# Patient Record
Sex: FEMALE | Race: BLACK OR AFRICAN AMERICAN | NOT HISPANIC OR LATINO | Employment: OTHER | URBAN - METROPOLITAN AREA
[De-identification: names, ages, dates, MRNs, and addresses within clinical notes are randomized per-mention and may not be internally consistent; named-entity substitution may affect disease eponyms.]

---

## 2017-03-10 ENCOUNTER — OFFICE VISIT (OUTPATIENT)
Dept: OTOLARYNGOLOGY | Facility: CLINIC | Age: 72
End: 2017-03-10
Payer: COMMERCIAL

## 2017-03-10 ENCOUNTER — HOSPITAL ENCOUNTER (OUTPATIENT)
Dept: RADIOLOGY | Facility: HOSPITAL | Age: 72
Discharge: HOME | End: 2017-03-10
Attending: OTOLARYNGOLOGY
Payer: COMMERCIAL

## 2017-03-10 ENCOUNTER — PATIENT MESSAGE (OUTPATIENT)
Dept: OTOLARYNGOLOGY | Facility: CLINIC | Age: 72
End: 2017-03-10

## 2017-03-10 VITALS
TEMPERATURE: 98 F | WEIGHT: 132.25 LBS | DIASTOLIC BLOOD PRESSURE: 69 MMHG | SYSTOLIC BLOOD PRESSURE: 122 MMHG | HEIGHT: 66 IN | HEART RATE: 80 BPM | BODY MASS INDEX: 21.25 KG/M2

## 2017-03-10 DIAGNOSIS — J32.9 CHRONIC SINUSITIS, UNSPECIFIED LOCATION: ICD-10-CM

## 2017-03-10 DIAGNOSIS — J30.89 PERENNIAL ALLERGIC RHINITIS, UNSPECIFIED ALLERGIC RHINITIS TRIGGER: Primary | ICD-10-CM

## 2017-03-10 DIAGNOSIS — H61.23 BILATERAL IMPACTED CERUMEN: ICD-10-CM

## 2017-03-10 DIAGNOSIS — J30.89 PERENNIAL ALLERGIC RHINITIS, UNSPECIFIED ALLERGIC RHINITIS TRIGGER: ICD-10-CM

## 2017-03-10 PROCEDURE — 69210 REMOVE IMPACTED EAR WAX UNI: CPT | Mod: S$GLB,,, | Performed by: OTOLARYNGOLOGY

## 2017-03-10 PROCEDURE — 99204 OFFICE O/P NEW MOD 45 MIN: CPT | Mod: 25,S$GLB,, | Performed by: OTOLARYNGOLOGY

## 2017-03-10 PROCEDURE — 3074F SYST BP LT 130 MM HG: CPT | Mod: S$GLB,,, | Performed by: OTOLARYNGOLOGY

## 2017-03-10 PROCEDURE — 99999 PR PBB SHADOW E&M-EST. PATIENT-LVL III: CPT | Mod: PBBFAC,,, | Performed by: OTOLARYNGOLOGY

## 2017-03-10 PROCEDURE — 3078F DIAST BP <80 MM HG: CPT | Mod: S$GLB,,, | Performed by: OTOLARYNGOLOGY

## 2017-03-10 PROCEDURE — 70220 X-RAY EXAM OF SINUSES: CPT | Mod: TC,PO

## 2017-03-10 PROCEDURE — 70220 X-RAY EXAM OF SINUSES: CPT | Mod: 26,,, | Performed by: RADIOLOGY

## 2017-03-10 PROCEDURE — 1157F ADVNC CARE PLAN IN RCRD: CPT | Mod: S$GLB,,, | Performed by: OTOLARYNGOLOGY

## 2017-03-10 PROCEDURE — 1159F MED LIST DOCD IN RCRD: CPT | Mod: S$GLB,,, | Performed by: OTOLARYNGOLOGY

## 2017-03-10 PROCEDURE — 1160F RVW MEDS BY RX/DR IN RCRD: CPT | Mod: S$GLB,,, | Performed by: OTOLARYNGOLOGY

## 2017-03-10 RX ORDER — LINACLOTIDE 290 UG/1
CAPSULE, GELATIN COATED ORAL
Refills: 0 | COMMUNITY
Start: 2016-12-23 | End: 2018-10-15

## 2017-03-10 RX ORDER — TRAZODONE HYDROCHLORIDE 50 MG/1
50 TABLET ORAL NIGHTLY
Refills: 0 | COMMUNITY
Start: 2016-12-07 | End: 2017-06-06

## 2017-03-10 RX ORDER — MONTELUKAST SODIUM 10 MG/1
10 TABLET ORAL DAILY
Refills: 0 | COMMUNITY
Start: 2017-03-02 | End: 2018-05-21

## 2017-03-10 NOTE — PATIENT INSTRUCTIONS
Impacted Earwax  Impacted earwax is a buildup of the natural wax in the ear (cerumen). Impacted earwax is very common. It can cause symptoms such as hearing loss. It can also stop a doctor doing an exam of your ear.    Understanding earwax  Tiny glands in your ear make substances that combine with dead skin cells to form earwax. Earwax helps protect your ear canal from water, dirt, infection, and injury. Over time, earwax travels from the inner part of your ear canal to the entrance of the canal. Then it falls away naturally. But in some cases, it cant travel to the entrance of the canal. This may be because of a health condition or objects put in the ear. With age, earwax tends to become harder and less fluid. Older adults are more likely to have problems with earwax buildup.    Symptoms of impacted earwax  Excess earwax usually does not cause any symptoms, unless there is a large amount of buildup. Then it may cause symptoms such as:  · Hearing loss  · Earache  · Sense of ear fullness  · Itching in the ear  · Dizziness  · Ringing in the ears  · Cough    Treatment for impacted earwax  If you dont have symptoms, you may not need treatment. Often the earwax goes away on its own with time. If you have symptoms, you may have 1 or more treatments such as:  · Ear drops.  Over-the-counter ear rinses such as Debrox can be used to help soften and rinse out wax buildup within the ear.  Also the use of mineral oil in the ear can help soften the wax and assist in allowing the wax to clear.  · Removal of the earwax with small tools. This is also done in a doctors office.  ·     Dont use these at home  Health care providers do not advise use of ear candles or ear vacuum kits. These methods are not shown to work.   Preventing impacted earwax  You may not be able to prevent impacted earwax if you have a health condition that causes it, such as eczema. In other cases, you may be able to prevent earwax buildup by:  · Using ear  drops such as Debrox once or twice a week  · Not using cotton swabs in the ear        If earwax is persistent after conservative measures such as using mineral oil and over-the-counter ear rinses, he will need contact your physician for an appointment to have it removed in the office.

## 2017-03-10 NOTE — PROGRESS NOTES
Subjective:   Patient: Saskia Torres [id number], :1945   Visit date:3/10/2017 3:26 PM    Chief Complaint:  Sinus Problem    HPI:  Saskia is a 71 y.o. female is here for evaluation of the following issue(s):    Sinonasal / Allergy: Saskia has no significant nasal obstruction. She reports the the following sinonasal symptoms: facial pain, facial pressure, headaches, post-nasal drip and rhinorrhea (runny nose) and occasional nosebleeds.   She denies the the following sinonasal symptoms: aspirin intolerance, asthma, nasal obstruction, significant history of dental procedure just prior to the onset of sinus problems, significant history of prior facial trauma or fractures and history of nasal surgery. She has been  on medications for these symptoms. Medications: Flonase, ORAL ANTIBIOTIC(S), Allegra and Zyrtec which has been ineffective.    She has had constant sinus infections in the past 12 months.  There have been no recent imaging study of the sinuses (none).  She had CT maxillofacial 7 years ago after a fall.    She has moderate allergic rhinitis with symptoms including coughing, eye itchiness, eye watering, nasal itchiness, nasal rhinorrhea, sneezing and itchy throat.  She denies the following allergy symptoms:   wheezing    Allergy testing for this patient was not done.    Current smoker:  No      Review of Systems:  Gen:  []fever   []fatigue  HENT:  []nosebleeds  []dental problem   Eyes:  []photophobia  []visual disturbance  Resp:  []chest tightness []wheezing  Card:  []chest pain  []leg swelling  GI:  []abdominal pain []blood in stool  :  []dysuria  []hematuria  Musc:  []joint swelling  []gait problem  Skin:  []color change  []pallor  Neuro:  []seizures  []numbness  Hem:  []bruise/bleed easily  Psych:  []hallucinations  []behavioral problems  Allergy/Imm: is allergic to no known drug allergies.    Her meds, allergies, medical, surgical, social & family histories were reviewed & updated:  -     She has  "a current medication list which includes the following prescription(s): dymista, estradiol, ezetimibe-simvastatin 10-20 mg, famciclovir, furosemide, linzess, lisinopril, lisinopril-hydrochlorothiazide, lisinopril-hydrochlorothiazide, montelukast, olopatadine, potassium chloride sa, prednisolone acetate, synthroid, topiramate, trazodone, veramyst, and zetia.  -     She  has a past medical history of Cataract; Hyperlipidemia; Hypertension; Hypothyroid; Insulin overdose (2014); and Interstitial cystitis.   -     She  does not have any pertinent problems on file.   -     She  has a past surgical history that includes  section; Hysterectomy; Oophorectomy; Cataract extraction w/  intraocular lens implant (Right, OD 14); and Cataract extraction w/  intraocular lens implant (Left, 3-18-15).  -     She  reports that she has quit smoking. She has never used smokeless tobacco. She reports that she drinks alcohol. She reports that she does not use illicit drugs.  -     Her family history includes Hyperlipidemia in her father; Hypertension in her father.  -     She is allergic to no known drug allergies.    Objective:     Physical Exam:  Vitals:  /69  Pulse 80  Temp 97.9 °F (36.6 °C)  Ht 5' 6" (1.676 m)  Wt 60 kg (132 lb 4.4 oz)  BMI 21.35 kg/m2  Appearance:  Well-developed, well-nourished.  Communication:  Able to communicate, no hoarseness.  Head & Face:  Normocephalic, atraumatic, no sinus tenderness, normal facial strength.  Eyes:  Extraocular motions intact.  Ears:  After cerumen removal, Otoscopy of external auditory canals and tympanic membranes was normal, clinical speech reception thresholds grossly intact, no mass/lesion of auricle.  Nose:  No masses/lesions of external nose, nasal mucosa, septum, and turbinates were within normal limits.  Mouth:  No mass/lesion of lips, teeth, gums, hard/soft palate, tongue, tonsils, or oropharynx.  Neck & Lymphatics:  No cervical lymphadenopathy, no neck " mass/crepitus/ asymmetry, trachea is midline, no thyroid enlargement/tenderness/mass.  Neuro/Psych: Alert with normal mood and affect.   Abdominal: Normal appearance.   Respiration/Chest:  Symmetric expansion during respiration, normal respiratory effort.  Skin:  Warm and intact  Cardiovascular:  No peripheral vascular edema or varicosities.  Unable to visualize larynx on mirror exam.    I personally reviewed the CT maxillofacial from 2010.  There was no significant mucosal thickening or air-fluid levels except for a very small amount of secretions in the left sphenoid.    Assessment & Plan:   Saskia was seen today for sinus problem.    Diagnoses and all orders for this visit:    Perennial allergic rhinitis, unspecified allergic rhinitis trigger  -     CBC auto differential; Future  -     Aspergillus fumagatus IgE; Future  -     Bermuda grass IgE; Future  -     Cat epithelium IgE; Future  -     Cladosporium IgE; Future  -     Cockroach, American IgE; Future  -     [place], bald IgE; Future  -     D. farinae IgE; Future  -     D. pteronyssinus IgE; Future  -     Dog dander IgE; Future  -     Plantain, English IgE; Future  -     Dru grass IgE; Future  -     Marsh elder, rough IgE; Future  -     Mugwort IgE; Future  -     Nettle IgE; Future  -     Oak, white IgE; Future  -     Penicillium IgE; Future  -     Ragweed, short, common IgE; Future  -     Juan Manuel IgE; Future  -     Allergen, Cocklebur; Future  -     Allergen, Elm Cedar; Future  -     Allergen, Meadow Grass ([facility]); Future  -     Allergen, Mucor Racemosus; Future  -     Allergen, Pecan [place] IgE; Future  -     Allergen, White Keo; Future  -     Allergen-Alternaria Alternata; Future  -     Allergen-Cedar; Future  -     Allergen-Common Pigweed; Future  -     Allergen-Silver Birch; Future  -     Feather Panel #2; Future  -     RAST Allergen for [place]; Future  -     RAST Allergen Maple ([place]); Future  -     RAST Allergen [place];  Future  -     RAST Allergen, [place]arters; Future  -     RAST Allergen, Sheep [place]); Future  -     X-Ray Sinuses Min 3 Views; Future    Bilateral impacted cerumen    Chronic sinusitis, unspecified location  -     CBC auto differential; Future  -     Aspergillus fumagatus IgE; Future  -     Bermuda grass IgE; Future  -     Cat epithelium IgE; Future  -     Cladosporium IgE; Future  -     Cockroach, American IgE; Future  -     [place], bald IgE; Future  -     D. farinae IgE; Future  -     D. pteronyssinus IgE; Future  -     Dog dander IgE; Future  -     Plantain, English IgE; Future  -     Dru grass IgE; Future  -     Marsh elder, rough IgE; Future  -     Mugwort IgE; Future  -     Nettle IgE; Future  -     Oak, white IgE; Future  -     Penicillium IgE; Future  -     Ragweed, short, common IgE; Future  -     Juan Manuel IgE; Future  -     Allergen, Cocklebur; Future  -     Allergen, Elm Cedar; Future  -     Allergen, Meadow Grass ([facility]); Future  -     Allergen, Mucor Racemosus; Future  -     Allergen, Pecan [place] IgE; Future  -     Allergen, White Keo; Future  -     Allergen-Alternaria Alternata; Future  -     Allergen-Cedar; Future  -     Allergen-Common Pigweed; Future  -     Allergen-Silver Birch; Future  -     Feather Panel #2; Future  -     RAST Allergen for [place]; Future  -     RAST Allergen Maple ([place]); Future  -     RAST Allergen [place]; Future  -     RAST Allergen, Lamb's Quarters; Future  -     RAST Allergen, Sheep Lavalette([place]); Future  -     X-Ray Sinuses Min 3 Views; Future    -SINUSITIS/RHINITIS  Saskia presents today for initial evaluation.  They have multiple sinonasal complaints and determination of the underlying etiology is a problem of moderate to high complexity.  Patients may present with sinonasal symptoms such as nasal obstruction as a primary anatomic disorder.  Patients may also present with recurrent or chronic inflammatory sinonasal  symptoms.  Generally, patients can be stratified into one of several groups.      The first group represents patients who have frequent or recurrent upper respiratory infections, most frequently viral.  These patients most frequently have normally functioning immune system's but have high levels of exposure.  This is commonly seen in nurses and schoolteachers as well as other groups who spend large amounts of time around 6 patient's and children.      Other patients may have isolated rhinitis without evidence of sinusitis.  The majority of these patients have ALLERGIC rhinitis however other groups may have more rare forms of rhinitis such as nonallergic rhinitis with eosinophilia syndrome.  As a screening evaluation, if the patient has had a normal endoscopy, from time to time a simple x-ray of the sinuses may be performed in combination with antigen specific ALLERGY testing.    The third group of patients present with significant evidence of chronic sinusitis.  Nasal endoscopy may reveal polyps or purulent drainage.  CT scan is an important aspect to determining the extent of disease.  Some patients with chronic sinusitis have an underlying etiology of ALLERGY leading to obstruction of the ostiomeatal units with furthering of the infection.  Others may have an acute infection that leads to stenosis of the sinonasal openings followed by a long, progressive course of infection.  Patients with unilateral disease who do not have inverting papilloma typically fall into this latter group.    CT scan of the sinuses has not been performed.      Antigen specific allergy testing  has not been performed.    Allergy treatment with topical steroids and/or antihistamines has been used with good compliance with symptoms unchanged.      By review of all data, history and exam, there does not seem to be a clear distinction between allergic rhinitis versus rhinitis with a component of chronic sinusitis.        My recommendation is  plain films of the sinuses, antigen specific allergy testing.       Patient: Saskia Torres [id number], :1945  Procedure date:3/10/2017  Patient's medications, allergies, past medical, surgical, social and family histories were reviewed and updated as appropriate.  Chief Complaint:  Sinus Problem    HPI:  Saskia is a 71 y.o. female with the history of present illness as discussed in the clinic note from today.  During this unrelated patient encounter I observed impacted cerumen.  The otoscopic examination of the tympanic membrane was not possible due to copious cerumen impaction.      Procedure: The patient was in agreement with the examination and debridement of the ears. Removal of the cerumen required a high level of expertise and use of an operating microscope and multiple micro-instruments.     With the patient in the supine position, we used the operating microscope to examine both ears with the appropriate sized ear speculum.  A variety of sterile, micro-instruments were utilized to remove the cerumen atraumatically.  I performed the procedure which required a significant amount of time and effort. The tympanic membrane was then well visualized.  The patient tolerated the procedure well and there were no complications.    Findings:   Right ear had significant wax, the EAC was normal, and the tympanic membrane was intact with no evidence of middle ear fluid.    Left ear had significant wax, the EAC was normal, and the tympanic membrane was intact with no evidence of middle ear fluid.

## 2017-03-10 NOTE — MR AVS SNAPSHOT
[facility]  [address] [place] [address]  Phone: [phone]  Fax: [phone]                  Saskia Torres   3/10/2017 3:30 PM   Office Visit    Description:  Female : 1945   Provider:  Rashawn Szymanski MD   Department:  [facility] - ENT           Reason for Visit     Sinus Problem           Diagnoses this Visit        Comments    Perennial allergic rhinitis, unspecified allergic rhinitis trigger    -  Primary     Bilateral impacted cerumen         Chronic sinusitis, unspecified location                To Do List           Future Appointments        Provider Department Dept Phone    3/10/2017 4:00 PM [facility] XR2 [facility] [phone]    3/10/2017 5:45 PM LAB, SAME DAY [facility] [phone]    3/16/2017 9:00 AM Lucy Guzmán PA-C [facility] [phone]      Goals (5 Years of Data)     None      [facility]     [facility] -  Assistance  Registered nurses in the [facility] provide clinical advisement, health education, appointment booking, and other advisory services.  Call for this free service at [phone].             Medications           Message regarding Medications     Verify the changes and/or additions to your medication regime listed below are the same as discussed with your clinician today.  If any of these changes or additions are incorrect, please notify your healthcare provider.             Verify that the below list of medications is an accurate representation of the medications you are currently taking.  If none reported, the list may be blank. If incorrect, please contact your healthcare provider. Carry this list with you in case of emergency.           Current Medications     DYMISTA 137-50 mcg/spray Spry 1 spray by Each Nare route once daily.    estradiol (ESTRACE) 0.01 % (0.1 mg/gram) vaginal cream Place 1 g vaginally once daily. Place nightly only on external vagina - for   "vaginal atrophy    ezetimibe-simvastatin 10-20 mg (VYTORIN) 10-20 mg per tablet Take 1 tablet by mouth every other day.    famciclovir (FAMVIR) 500 MG tablet     furosemide (LASIX) 20 MG tablet Take 20 mg by mouth once daily.    LINZESS 290 mcg Cap     lisinopril (PRINIVIL,ZESTRIL) 40 MG tablet Take 40 mg by mouth every morning.    lisinopril-hydrochlorothiazide (PRINZIDE,ZESTORETIC) 10-12.5 mg per tablet     lisinopril-hydrochlorothiazide (PRINZIDE,ZESTORETIC) 20-25 mg Tab     montelukast (SINGULAIR) 10 mg tablet Take 10 mg by mouth once daily.    olopatadine (PATADAY) 0.2 % Drop Place 1 drop into both eyes daily as needed.    potassium chloride SA (K-DUR,KLOR-CON) 20 MEQ tablet Take 20 mEq by mouth once daily.    prednisoLONE acetate (PRED FORTE) 1 % DrpS Place 1 drop into the left eye 4 (four) times daily. Start one day before surgery    SYNTHROID 25 mcg tablet     topiramate (TOPAMAX) 25 MG tablet Take 25 mg by mouth every evening.    trazodone (DESYREL) 50 MG tablet Take 50 mg by mouth every evening.    VERAMYST 27.5 mcg/actuation nasal spray 2 sprays once daily.    ZETIA 10 mg tablet            Clinical Reference Information           Your Vitals Were     BP Pulse Temp Height Weight BMI    122/69 80 97.9 °F (36.6 °C) 5' 6" (1.676 m) 60 kg (132 lb 4.4 oz) 21.35 kg/m2      Blood Pressure          Most Recent Value    BP  122/69      Allergies as of 3/10/2017     No Known Drug Allergies      Immunizations Administered on Date of Encounter - 3/10/2017     None      Orders Placed During Today's Visit     Future Labs/Procedures Expected by Expires    Allergen, Cocklebur  3/10/2017 5/9/2018    Allergen, Elm [place]  3/10/2017 5/9/2018    Allergen, Meadow Grass ([place])  3/10/2017 5/9/2018    Allergen, Mucor Racemosus  3/10/2017 5/9/2018    Allergen, Pecan [place] IgE  3/10/2017 5/9/2018    Allergen, White Keo  3/10/2017 5/9/2018    Allergen-Alternaria Alternata  3/10/2017 5/9/2018    Allergen-[place]  3/10/2017 " 5/9/2018    Allergen-Common Pigweed  3/10/2017 5/9/2018    Allergen-Silver Birch  3/10/2017 5/9/2018    Aspergillus fumagatus IgE  3/10/2017 5/9/2018    Bermuda grass IgE  3/10/2017 5/9/2018    Cat epithelium IgE  3/10/2017 5/9/2018    CBC auto differential  3/10/2017 5/9/2018    Cladosporium IgE  3/10/2017 5/9/2018    Cockroach, American IgE  3/10/2017 5/9/2018    [place], bald IgE  3/10/2017 5/9/2018    D. farinae IgE  3/10/2017 5/9/2018    D. pteronyssinus IgE  3/10/2017 5/9/2018    Dog dander IgE  3/10/2017 5/9/2018    Feather Panel #2  3/10/2017 5/9/2018    Dru grass IgE  3/10/2017 5/9/2018    [place] elder, rough IgE  3/10/2017 5/9/2018    Mugwort IgE  3/10/2017 5/9/2018    Nettle IgE  3/10/2017 5/9/2018    [place], white IgE  3/10/2017 5/9/2018    Penicillium IgE  3/10/2017 5/9/2018    Plantain, English IgE  3/10/2017 5/9/2018    Ragweed, short, common IgE  3/10/2017 5/9/2018    RAST Allergen for [place]  3/10/2017 5/9/2018    RAST Allergen Maple ([place])  3/10/2017 5/9/2018    RAST Allergen [place]  3/10/2017 5/9/2018    RAST Allergen, [place]  3/10/2017 5/9/2018    RAST Allergen, Sheep [place](Yellow Dock)  3/10/2017 5/9/2018    Juan Manuel IgE  3/10/2017 5/9/2018    X-Ray Sinuses Min 3 Views  3/10/2017 3/10/2018      Instructions      Impacted Earwax  Impacted earwax is a buildup of the natural wax in the ear (cerumen). Impacted earwax is very common. It can cause symptoms such as hearing loss. It can also stop a doctor doing an exam of your ear.    Understanding earwax  Tiny glands in your ear make substances that combine with dead skin cells to form earwax. Earwax helps protect your ear canal from water, dirt, infection, and injury. Over time, earwax travels from the inner part of your ear canal to the entrance of the canal. Then it falls away naturally. But in some cases, it cant travel to the entrance of the canal. This may be because of a health condition or objects put in the ear. With  age, earwax tends to become harder and less fluid. Older adults are more likely to have problems with earwax buildup.    Symptoms of impacted earwax  Excess earwax usually does not cause any symptoms, unless there is a large amount of buildup. Then it may cause symptoms such as:  · Hearing loss  · Earache  · Sense of ear fullness  · Itching in the ear  · Dizziness  · Ringing in the ears  · Cough    Treatment for impacted earwax  If you dont have symptoms, you may not need treatment. Often the earwax goes away on its own with time. If you have symptoms, you may have 1 or more treatments such as:  · Ear drops.  Over-the-counter ear rinses such as Debrox can be used to help soften and rinse out wax buildup within the ear.  Also the use of mineral oil in the ear can help soften the wax and assist in allowing the wax to clear.  · Removal of the earwax with small tools. This is also done in a doctors office.  ·     Dont use these at home  Health care providers do not advise use of ear candles or ear vacuum kits. These methods are not shown to work.   Preventing impacted earwax  You may not be able to prevent impacted earwax if you have a health condition that causes it, such as eczema. In other cases, you may be able to prevent earwax buildup by:  · Using ear drops such as Debrox once or twice a week  · Not using cotton swabs in the ear        If earwax is persistent after conservative measures such as using mineral oil and over-the-counter ear rinses, he will need contact your physician for an appointment to have it removed in the office.         Language Assistance Services     ATTENTION: Language assistance services are available, free of charge. Please call [phone].      ATENCIÓN: Si habla leodanfani, tiene a nunez disposición servicios gratuitos de asistencia lingüística. Llame al [phone].     CHÚ Ý: N?u b?n nói Ti?ng Vi?t, có các d?ch v? h? tr? ngôn ng? mi?n phí dành cho b?n. G?i s? [phone].          [facility] complies with applicable Federal civil rights laws and does not discriminate on the basis of race, color, national origin, age, disability, or sex.

## 2017-03-21 ENCOUNTER — TELEPHONE (OUTPATIENT)
Dept: OTOLARYNGOLOGY | Facility: CLINIC | Age: 72
End: 2017-03-21

## 2017-03-21 NOTE — TELEPHONE ENCOUNTER
Spoke with pt.  Advised pt that her allergy testing was negative.  Pt questioned what that meant.  Informed pt that it means her symptoms are not caused from the 40 allergens she was tested for.  Asked pt if she wanted another message routed to Dr. Szymanski regarding any future treatment plan, pt declined and hung up.

## 2017-03-21 NOTE — TELEPHONE ENCOUNTER
----- Message from Rashawn Szymanski MD sent at 3/20/2017  5:06 PM CDT -----  Please call patient with normal results- no significant allergy detected.  Thank you.

## 2017-06-05 ENCOUNTER — TELEPHONE (OUTPATIENT)
Dept: INTERNAL MEDICINE | Facility: CLINIC | Age: 72
End: 2017-06-05

## 2017-06-05 NOTE — TELEPHONE ENCOUNTER
----- Message from Melissa Blanco sent at 6/5/2017  3:41 PM CDT -----  Contact: [phone]  Patient has a sore throat and congestion. Patient would like to be seen sooner that first available. Please call back at [phone].    Thanks,  Melissa Blanco

## 2017-06-06 ENCOUNTER — OFFICE VISIT (OUTPATIENT)
Dept: INTERNAL MEDICINE | Facility: CLINIC | Age: 72
End: 2017-06-06
Payer: COMMERCIAL

## 2017-06-06 VITALS
OXYGEN SATURATION: 98 % | TEMPERATURE: 98 F | DIASTOLIC BLOOD PRESSURE: 70 MMHG | WEIGHT: 139.75 LBS | HEART RATE: 76 BPM | HEIGHT: 64 IN | SYSTOLIC BLOOD PRESSURE: 104 MMHG | BODY MASS INDEX: 23.86 KG/M2

## 2017-06-06 DIAGNOSIS — M79.651 PAIN IN BOTH THIGHS: Primary | ICD-10-CM

## 2017-06-06 DIAGNOSIS — E03.9 HYPOTHYROIDISM, UNSPECIFIED TYPE: ICD-10-CM

## 2017-06-06 DIAGNOSIS — M79.652 PAIN IN BOTH THIGHS: Primary | ICD-10-CM

## 2017-06-06 DIAGNOSIS — I10 ESSENTIAL HYPERTENSION: ICD-10-CM

## 2017-06-06 PROCEDURE — 1159F MED LIST DOCD IN RCRD: CPT | Mod: S$GLB,,, | Performed by: FAMILY MEDICINE

## 2017-06-06 PROCEDURE — 99214 OFFICE O/P EST MOD 30 MIN: CPT | Mod: S$GLB,,, | Performed by: FAMILY MEDICINE

## 2017-06-06 PROCEDURE — 99999 PR PBB SHADOW E&M-EST. PATIENT-LVL III: CPT | Mod: PBBFAC,,, | Performed by: FAMILY MEDICINE

## 2017-06-06 RX ORDER — OMEPRAZOLE 40 MG/1
40 CAPSULE, DELAYED RELEASE ORAL DAILY
Qty: 30 CAPSULE | Refills: 0 | Status: SHIPPED | OUTPATIENT
Start: 2017-06-06 | End: 2018-05-21

## 2017-06-06 NOTE — PATIENT INSTRUCTIONS
Daily benefiber with glass water  Daily miralax as needed for constipation  Avoid ibuprofen/aleve (tylenol/acetaminophen ok)    Try stopping vytorin 2 weeks then resume. Notify Dr Sarah if definite difference in leg symptoms off/on medicine

## 2017-06-09 DIAGNOSIS — Z12.31 OTHER SCREENING MAMMOGRAM: ICD-10-CM

## 2017-06-13 ENCOUNTER — CLINICAL SUPPORT (OUTPATIENT)
Dept: CARDIOLOGY | Facility: CLINIC | Age: 72
End: 2017-06-13
Payer: COMMERCIAL

## 2017-06-13 DIAGNOSIS — M79.652 PAIN IN BOTH THIGHS: ICD-10-CM

## 2017-06-13 DIAGNOSIS — M79.651 PAIN IN BOTH THIGHS: ICD-10-CM

## 2017-06-13 LAB — VASCULAR ANKLE BRACHIAL INDEX (ABI) LEFT: 0.99 (ref 0.9–1.2)

## 2017-06-13 PROCEDURE — 93922 UPR/L XTREMITY ART 2 LEVELS: CPT | Mod: S$GLB,,, | Performed by: INTERNAL MEDICINE

## 2017-06-19 NOTE — PROGRESS NOTES
Subjective:       Patient ID: Saskia Torres is a 71 y.o. female.    Chief Complaint: Multiple issues see below  HPIhypothy she asks about natural thyroid; i advised her against;utd dr lopez  Feels like food occas getting hung up couple weeks. Infreq. No pain. Some reflux   Chronic constipation x yrs on linzess via dr lopez . We d/wd fiber.   C/o chronic ?fluid retention snsation bilat thighs. No actual swelling seen. Discomfort  Hypertension: blood pressures at home normal. Tolerating medicine.   Hypercholesterolemia: controlled. Tolerating medicine.      Past Medical History:   Diagnosis Date    Cataract     Hyperlipidemia     dr lopez    Hypertension     Hypothyroid     dr lopez    Insulin overdose 2014    Given 100U insulin at [facility] office () accidentally instead of estrogen depot    Interstitial cystitis      Past Surgical History:   Procedure Laterality Date    CATARACT EXTRACTION W/  INTRAOCULAR LENS IMPLANT Right OD 14    CATARACT EXTRACTION W/  INTRAOCULAR LENS IMPLANT Left 3-18-15     SECTION      HYSTERECTOMY      bleeding    OOPHORECTOMY      one only     Family History   Problem Relation Age of Onset    Hypertension Father     Hyperlipidemia Father      Social History     Social History    Marital status:      Spouse name: N/A    Number of children: 3    Years of education: N/A     Social History Main Topics    Smoking status: Former Smoker    Smokeless tobacco: Never Used    Alcohol use Yes      Comment: socially     Drug use: No    Sexual activity: Not Asked     Other Topics Concern    None     Social History Narrative    None       Review of Systems  .  Objective:      Physical Exam   Constitutional: She is oriented to person, place, and time. She appears well-developed and well-nourished. No distress.   HENT:   Head: Atraumatic.   Right Ear: External ear normal.   Left Ear: External ear normal.   Nose: Nose normal.   Mouth/Throat:  Oropharynx is clear and moist. No oropharyngeal exudate.   bilat tms nl   Eyes: Conjunctivae and EOM are normal. Pupils are equal, round, and reactive to light. No scleral icterus.   Neck: Normal range of motion. Neck supple. No thyromegaly present.   Cardiovascular: Normal rate, regular rhythm and normal heart sounds.    No murmur heard.  Pulmonary/Chest: Effort normal and breath sounds normal. No respiratory distress. She has no wheezes. She has no rales.   Abdominal: Soft. Bowel sounds are normal. She exhibits no distension and no mass. There is no hepatosplenomegaly. There is no tenderness. There is no rebound and no guarding.   Musculoskeletal: Normal range of motion. She exhibits no edema or tenderness.   Lymphadenopathy:     She has no cervical adenopathy.   Neurological: She is alert and oriented to person, place, and time. No cranial nerve deficit. She exhibits normal muscle tone. Coordination normal.   Skin: Skin is warm. No rash noted. No erythema. No pallor.   Psychiatric: She has a normal mood and affect. Her behavior is normal. Judgment and thought content normal.   Nursing note and vitals reviewed.    no palpable dors pedia left side  Assessment:       1. Pain in both thighs    2. Essential hypertension    3. Hypothyroidism, unspecified type      hyperchol  gerd/dysphagia  Plan:     Daily benefiber with glass water  Daily miralax as needed for constipation  Avoid ibuprofen/aleve (tylenol/acetaminophen ok)    Try stopping vytorin 2 weeks then resume. Notify Dr Sarah if definite difference in leg symptoms off/on medicine  If ppi no help couple weeks swallowing issue then notify me; otw d/w at f  If sympt cont or recur then egd  F/uone month on dysphagi  *Pain in both thighs  -     Cardiology Lab [facility] Resting, Lower Extremities; Future    Essential hypertension    Hypothyroidism, unspecified type    Other orders  -     omeprazole (PRILOSEC) 40 MG capsule; Take 1 capsule (40 mg total) by mouth once  daily.  Dispense: 30 capsule; Refill: 0    **

## 2017-06-21 ENCOUNTER — TELEPHONE (OUTPATIENT)
Dept: INTERNAL MEDICINE | Facility: CLINIC | Age: 72
End: 2017-06-21

## 2017-06-21 NOTE — TELEPHONE ENCOUNTER
----- Message from Gurjit Hope MD sent at 6/20/2017  5:39 PM CDT -----  Please notify her there is minimal sign of blockage left leg blood flow. Since she does have some leg symptoms it is worth her either following up with her cardiologist to discuss or seeing one of ours who does vascular evaluations

## 2017-06-27 ENCOUNTER — OFFICE VISIT (OUTPATIENT)
Dept: INTERNAL MEDICINE | Facility: CLINIC | Age: 72
End: 2017-06-27
Payer: COMMERCIAL

## 2017-06-27 ENCOUNTER — HOSPITAL ENCOUNTER (OUTPATIENT)
Dept: RADIOLOGY | Facility: HOSPITAL | Age: 72
Discharge: HOME | End: 2017-06-27
Attending: FAMILY MEDICINE
Payer: COMMERCIAL

## 2017-06-27 VITALS
DIASTOLIC BLOOD PRESSURE: 62 MMHG | WEIGHT: 142.44 LBS | HEIGHT: 64 IN | HEART RATE: 71 BPM | OXYGEN SATURATION: 98 % | BODY MASS INDEX: 24.32 KG/M2 | TEMPERATURE: 97 F | SYSTOLIC BLOOD PRESSURE: 116 MMHG

## 2017-06-27 DIAGNOSIS — M79.604 RIGHT LEG PAIN: ICD-10-CM

## 2017-06-27 DIAGNOSIS — M79.604 RIGHT LEG PAIN: Primary | ICD-10-CM

## 2017-06-27 PROCEDURE — 99213 OFFICE O/P EST LOW 20 MIN: CPT | Mod: S$GLB,,, | Performed by: FAMILY MEDICINE

## 2017-06-27 PROCEDURE — 93971 EXTREMITY STUDY: CPT | Mod: 26,,, | Performed by: RADIOLOGY

## 2017-06-27 PROCEDURE — 1125F AMNT PAIN NOTED PAIN PRSNT: CPT | Mod: S$GLB,,, | Performed by: FAMILY MEDICINE

## 2017-06-27 PROCEDURE — 93971 EXTREMITY STUDY: CPT | Mod: TC,PO

## 2017-06-27 PROCEDURE — 1159F MED LIST DOCD IN RCRD: CPT | Mod: S$GLB,,, | Performed by: FAMILY MEDICINE

## 2017-06-27 PROCEDURE — 99999 PR PBB SHADOW E&M-EST. PATIENT-LVL III: CPT | Mod: PBBFAC,,, | Performed by: FAMILY MEDICINE

## 2017-06-29 ENCOUNTER — TELEPHONE (OUTPATIENT)
Dept: INTERNAL MEDICINE | Facility: CLINIC | Age: 72
End: 2017-06-29

## 2017-06-29 NOTE — TELEPHONE ENCOUNTER
----- Message from Gurjit Hope MD sent at 6/28/2017  7:00 PM CDT -----  Please let her know no blood clot causing pain

## 2017-07-10 NOTE — PROGRESS NOTES
Subjective:       Patient ID: Saskia Torres is a 71 y.o. female.    Chief Complaint: Multiple issues see below    HPIsome aches resolves off statin and is resuming.   Also d/w nick minimally abnl asymp  Left leg . Still some discomfort r knee area and occas above/below    Past Medical History:   Diagnosis Date    Cataract     Hyperlipidemia     dr lopez    Hypertension     Hypothyroid     dr lopez    Insulin overdose 2014    Given 100U insulin at [facility] () accidentally instead of estrogen depot    Interstitial cystitis      Past Surgical History:   Procedure Laterality Date    CATARACT EXTRACTION W/  INTRAOCULAR LENS IMPLANT Right OD 14    CATARACT EXTRACTION W/  INTRAOCULAR LENS IMPLANT Left 3-18-15     SECTION      HYSTERECTOMY      bleeding    OOPHORECTOMY      one only     Family History   Problem Relation Age of Onset    Hypertension Father     Hyperlipidemia Father      Social History     Social History    Marital status:      Spouse name: N/A    Number of children: 3    Years of education: N/A     Social History Main Topics    Smoking status: Former Smoker    Smokeless tobacco: Never Used    Alcohol use Yes      Comment: socially     Drug use: No    Sexual activity: Not Asked     Other Topics Concern    None     Social History Narrative    None       Review of Systems    Objective:    here w husb  Physical Exam  bilat lower ext 5/5 motor  dts ok  Neg slf alexandro  Tend r calf . No swelling.   Assessment:       1. Right leg pain        Plan:     j/s r le if confirms no dvt then consider PT  ot to rsume vytorin and see which aches stay /go and notify endocr on rsponse  *she will d/w statin issue with her endocr ;dr lopez req prev note**    Right leg pain  -     US Lower Extremity Veins Right; Future; Expected date: 2017

## 2018-05-21 ENCOUNTER — OFFICE VISIT (OUTPATIENT)
Dept: NEUROLOGY | Facility: CLINIC | Age: 73
End: 2018-05-21
Payer: COMMERCIAL

## 2018-05-21 VITALS
SYSTOLIC BLOOD PRESSURE: 136 MMHG | HEART RATE: 76 BPM | DIASTOLIC BLOOD PRESSURE: 70 MMHG | HEIGHT: 64 IN | BODY MASS INDEX: 22.89 KG/M2 | WEIGHT: 134.06 LBS

## 2018-05-21 DIAGNOSIS — R55 SYNCOPE AND COLLAPSE: ICD-10-CM

## 2018-05-21 DIAGNOSIS — R40.20 LOC (LOSS OF CONSCIOUSNESS): ICD-10-CM

## 2018-05-21 DIAGNOSIS — Z82.49 FAMILY HISTORY OF BRAIN ANEURYSM: ICD-10-CM

## 2018-05-21 PROCEDURE — 3078F DIAST BP <80 MM HG: CPT | Mod: CPTII,S$GLB,, | Performed by: PSYCHIATRY & NEUROLOGY

## 2018-05-21 PROCEDURE — 99999 PR PBB SHADOW E&M-EST. PATIENT-LVL III: CPT | Mod: PBBFAC,,, | Performed by: PSYCHIATRY & NEUROLOGY

## 2018-05-21 PROCEDURE — 3075F SYST BP GE 130 - 139MM HG: CPT | Mod: CPTII,S$GLB,, | Performed by: PSYCHIATRY & NEUROLOGY

## 2018-05-21 PROCEDURE — 99204 OFFICE O/P NEW MOD 45 MIN: CPT | Mod: S$GLB,,, | Performed by: PSYCHIATRY & NEUROLOGY

## 2018-05-21 RX ORDER — TRAZODONE HYDROCHLORIDE 50 MG/1
TABLET ORAL
COMMUNITY

## 2018-05-21 RX ORDER — LIOTHYRONINE SODIUM 5 UG/1
TABLET ORAL
COMMUNITY
End: 2018-05-21

## 2018-05-21 RX ORDER — FLUTICASONE PROPIONATE 50 MCG
1 SPRAY, SUSPENSION (ML) NASAL
COMMUNITY

## 2018-05-21 NOTE — PROGRESS NOTES
This is a 72-year-old right-handed patient who is accompanied to the office by her  today and indicates that she has had several episodes of unexplained loss of consciousness that occurred over the past 5 years.  Her last recognized episode occurred in January/February, 2018.    When asked to estimate frequency, the patient's  estimates that this may happen once a year but does occur quite randomly and without known precipitating events.  The episode begins with a sensation of lightheadedness that is noticed by the patient and then there is a loss of consciousness.  Patient states that she may lose her vision 1st before actually losing consciousness.  She will then lose muscle tone and falls to the ground.  The patient's  indicates that there has not been any witnessed tonic clonic activity with the episode.  There has been no evidence of tongue biting, excessive salivation, or incontinence with the episode.  The patient denies any associated symptoms of shortness of breath or chest pain but has had a sensation of palpitation.    Upon arousal from the event, the patient states that she feels weak and has a rather severe degree of headache pain. She remains tired and then will sleep afterwards.  The patient and her  feel that the events are more likely to occur during summer months then they are during the colder months of the year.  The events have always occurred when either standing or sitting and have never occurred while supine.    As a part of the evaluation, the patient states that she did wear a cardiac monitor for several days but did not have an episode during that time and no significant abnormality was found on the monitor.    The patient also has a history of chronic daily headache.  The patient states that she has had headaches most of her adult life with onset in her later years.  The pain is felt in a generalized distribution and is present on an almost daily basis.  If she  does not experience any associated  vomiting with the episode of headache pain but does sometimes experience photophobia and mild nausea when the pain is very severe.  The patient denies taking any chronic pain medications although she did try taking medication intermittently at 1st.      ROS:  GENERAL: No fever, chills, fatigability or weight loss.  SKIN: No rashes, itching or changes in color or texture of skin.  HEAD: No recent head trauma.  EYES: Visual acuity fine. No photophobia, ocular pain or diplopia.  EARS: Denies ear pain, discharge or vertigo.  NOSE: No loss of smell, no epistaxis or postnasal drip.  MOUTH & THROAT: No hoarseness or change in voice. No excessive gum bleeding.  NODES: Denies swollen glands.  CHEST: Denies VELAZQUEZ, cyanosis, wheezing, cough and sputum production.  CARDIOVASCULAR: Denies chest pain, PND, orthopnea or reduced exercise tolerance.  ABDOMEN: Appetite fine. No weight loss. Denies diarrhea, abdominal pain, hematemesis or blood in stool.  URINARY: No flank pain, dysuria or hematuria.  PERIPHERAL VASCULAR: No claudication or cyanosis.  MUSCULOSKELETAL: No joint stiffness or swelling. Denies back pain.  NEUROLOGIC: No history of  paralysis, alteration of gait or coordination.    Past history  Surgery:  , hysterectomy oophorectomy, cataract extraction with lens implant bilaterally, removal of benign ankle mass  Medical:  Hyperlipidemia, hypothyroidism interstitial cystitis  Allergies:  No known drug allergies    Family history:  The patient's mother is .  Her mother had a ruptured cerebral aneurysm in her mid 40s.  Her father  at age 53 of an acute MI.  She has multiple paternal uncles who  at a young age with cardiac disease and/or stroke.    Social history:  The patient is  and lives with her .  She is a former smoker.  She does have an occasional alcoholic beverage.  The patient's  states that the patient seems to take a large variety of  various nutritional supplements.    PE:   VITAL SIGNS:  Blood pressure seated 128/72, standing 122/76 and 138/78; pulse 76 and regular; weight 60.8 kg, height 5 ft 4 in, BMI 23.01  APPEARANCE: Well nourished, well developed, in no acute distress.    HEAD: Normocephalic, atraumatic.  EYES: PERRL. EOMI.  Non-icteric sclerae.    EARS: TM's intact. Light reflex normal. No retraction or perforation.    NOSE: Mucosa pink. Airway clear.  MOUTH & THROAT: No tonsillar enlargement. No pharyngeal erythema or exudate. No stridor.  NECK: Supple. No bruits.  CHEST: Lungs clear to auscultation.  CARDIOVASCULAR: Regular rhythm without significant murmurs.  ABDOMEN: Bowel sounds normal. Not distended. Soft. No tenderness or masses.  MUSCULOSKELETAL:  No bony deformity seen.  Muscle tone is normal.  Muscle mass is normal.  NEUROLOGIC:   Mental Status:  The patient is well oriented to person, time, place, and situation.  The patient is attentive to the environment and cooperative for the exam.  Cranial Nerves: II-XII grossly intact. Visual acuity with near card is 20/40 left eye, right eye, and both eyes.  The patient perceives the color red to be the same shaded read with each eye.  Fundoscopic exam is normal.  No hemorrhage, exudate or papilledema is present. The extraocular muscles are intact in the cardinal directions of gaze.  No ptosis is present. Facial features are symmetrical.  Speech is normal in fluency, diction, and phrasing.  Tongue protrudes in the midline.    Gait and Station:  Romberg is negative.  Good alternate armswing with normal gait.  Motor:  No downdrift of either arm when held at shoulder level.  Manual muscle testing of proximal and distal muscles of both upper and lower extremities is normal. Muscle mass is normal.  Muscle tone is normal.  Sensory:  Intact both upper and lower extremities to pin prick, touch, and vibration.  Cerebellar:  Finger to nose done well.  Alternating movements intact.  No involuntary  movements or tremor seen.  Reflexes:  Stretch reflexes are 2+ both upper and lower extremities.  Plantar stimulation is flexor bilaterally and no pathological reflexes are seen      Assessment:  The patient's history since suggest this to be a syncopal event rather than a form of seizure.  According to the patient and her , she has been advised to have long-term cardiac monitor implanted but she has been delaying that decision.  She does have a family history of a 1st order relatives with cerebral aneurysm and therefore should have imaging done to evaluate for a cerebral aneurysm.  I do not think that EEG in this situation would be of significant diagnostic benefit.  The events that she has her to infrequent occurring only once a year.  No limitations on activities are impose.    Recommendations  1.  Schedule CT angiogram of head and neck because of history of cerebral aneurysm in a 1st order relatives.  (Her mother)  2.  I would recommend long-term cardiac monitoring for this patient.    This was a 65 min visit with the patient and her  with over 50% time spent counseling the patient and her  regarding evaluation of recurrent syncope events.      This note is generated with speech recognition software and is subject to transcription error and sound alike phrases that may be missed by proofreading.

## 2018-05-22 ENCOUNTER — TELEPHONE (OUTPATIENT)
Dept: NEUROLOGY | Facility: CLINIC | Age: 73
End: 2018-05-22

## 2018-05-22 ENCOUNTER — PATIENT MESSAGE (OUTPATIENT)
Dept: NEUROLOGY | Facility: CLINIC | Age: 73
End: 2018-05-22

## 2018-05-22 ENCOUNTER — TELEPHONE (OUTPATIENT)
Dept: RADIOLOGY | Facility: HOSPITAL | Age: 73
End: 2018-05-22

## 2018-05-22 DIAGNOSIS — Z00.00 ROUTINE CHECK-UP: Primary | ICD-10-CM

## 2018-05-22 DIAGNOSIS — Z82.49 FAMILY HISTORY OF BRAIN ANEURYSM: ICD-10-CM

## 2018-05-22 DIAGNOSIS — R55 SYNCOPE AND COLLAPSE: Primary | ICD-10-CM

## 2018-05-22 NOTE — TELEPHONE ENCOUNTER
Pt needs stat orders for a Creatine level priior to her CT/ pt has been called just need the orders p ut in ..

## 2018-05-23 ENCOUNTER — HOSPITAL ENCOUNTER (OUTPATIENT)
Dept: RADIOLOGY | Facility: HOSPITAL | Age: 73
Discharge: HOME | End: 2018-05-23
Attending: PSYCHIATRY & NEUROLOGY
Payer: COMMERCIAL

## 2018-05-23 DIAGNOSIS — Z82.49 FAMILY HISTORY OF BRAIN ANEURYSM: ICD-10-CM

## 2018-05-23 DIAGNOSIS — R40.20 LOC (LOSS OF CONSCIOUSNESS): ICD-10-CM

## 2018-05-23 PROCEDURE — 70496 CT ANGIOGRAPHY HEAD: CPT | Mod: 26,,, | Performed by: RADIOLOGY

## 2018-05-23 PROCEDURE — 70498 CT ANGIOGRAPHY NECK: CPT | Mod: 26,,, | Performed by: RADIOLOGY

## 2018-05-23 PROCEDURE — 70496 CT ANGIOGRAPHY HEAD: CPT | Mod: TC,PO

## 2018-05-23 PROCEDURE — 25500020 PHARM REV CODE 255: Mod: PO | Performed by: PSYCHIATRY & NEUROLOGY

## 2018-05-23 RX ADMIN — IOHEXOL 100 ML: 350 INJECTION, SOLUTION INTRAVENOUS at 03:05

## 2018-05-24 ENCOUNTER — TELEPHONE (OUTPATIENT)
Dept: NEUROLOGY | Facility: CLINIC | Age: 73
End: 2018-05-24

## 2018-05-24 NOTE — TELEPHONE ENCOUNTER
----- Message from Adina Guzmán sent at 5/24/2018  2:57 PM CDT -----  Patient returning the nurse call. Please adv/call [phone].[email or web address]

## 2018-05-28 ENCOUNTER — OFFICE VISIT (OUTPATIENT)
Dept: INTERNAL MEDICINE | Facility: CLINIC | Age: 73
End: 2018-05-28
Payer: COMMERCIAL

## 2018-05-28 VITALS
TEMPERATURE: 99 F | SYSTOLIC BLOOD PRESSURE: 122 MMHG | BODY MASS INDEX: 23.13 KG/M2 | HEIGHT: 64 IN | OXYGEN SATURATION: 98 % | DIASTOLIC BLOOD PRESSURE: 70 MMHG | HEART RATE: 80 BPM | WEIGHT: 135.5 LBS

## 2018-05-28 DIAGNOSIS — J01.90 ACUTE SINUSITIS, RECURRENCE NOT SPECIFIED, UNSPECIFIED LOCATION: Primary | ICD-10-CM

## 2018-05-28 DIAGNOSIS — R05.9 COUGH: ICD-10-CM

## 2018-05-28 PROCEDURE — 3078F DIAST BP <80 MM HG: CPT | Mod: CPTII,S$GLB,, | Performed by: NURSE PRACTITIONER

## 2018-05-28 PROCEDURE — 99214 OFFICE O/P EST MOD 30 MIN: CPT | Mod: S$GLB,,, | Performed by: NURSE PRACTITIONER

## 2018-05-28 PROCEDURE — 99999 PR PBB SHADOW E&M-EST. PATIENT-LVL IV: CPT | Mod: PBBFAC,,, | Performed by: NURSE PRACTITIONER

## 2018-05-28 PROCEDURE — 3074F SYST BP LT 130 MM HG: CPT | Mod: CPTII,S$GLB,, | Performed by: NURSE PRACTITIONER

## 2018-05-28 RX ORDER — AMOXICILLIN AND CLAVULANATE POTASSIUM 875; 125 MG/1; MG/1
1 TABLET, FILM COATED ORAL 2 TIMES DAILY
Qty: 14 TABLET | Refills: 0 | Status: SHIPPED | OUTPATIENT
Start: 2018-05-28 | End: 2018-06-04

## 2018-05-28 RX ORDER — BENZONATATE 100 MG/1
100 CAPSULE ORAL 3 TIMES DAILY PRN
Qty: 30 CAPSULE | Refills: 0 | Status: SHIPPED | OUTPATIENT
Start: 2018-05-28 | End: 2018-06-07

## 2018-05-28 NOTE — PROGRESS NOTES
Subjective:       Patient ID: Saskia Torres is a 72 y.o. female.    Chief Complaint: Sore Throat; Cough (thick green cold); Headache; and Hoarse    Sinus Problem   This is a new problem. The current episode started 1 to 4 weeks ago (1 week). The problem is unchanged. There has been no fever. The pain is mild. Associated symptoms include congestion, coughing, sinus pressure and a sore throat. Pertinent negatives include no chills or shortness of breath.     Review of Systems   Constitutional: Negative for chills and fatigue.   HENT: Positive for congestion, sinus pressure and sore throat.    Respiratory: Positive for cough. Negative for shortness of breath.    Musculoskeletal: Negative for arthralgias and gait problem.   Psychiatric/Behavioral: Negative for agitation and confusion.       Objective:      Physical Exam   Constitutional: She is oriented to person, place, and time. Vital signs are normal. She appears well-developed and well-nourished.   HENT:   Head: Normocephalic and atraumatic.   Right Ear: Hearing, tympanic membrane, external ear and ear canal normal.   Left Ear: Hearing, tympanic membrane, external ear and ear canal normal.   Nose: Mucosal edema and rhinorrhea present. Right sinus exhibits maxillary sinus tenderness and frontal sinus tenderness. Left sinus exhibits maxillary sinus tenderness and frontal sinus tenderness.   Mouth/Throat: Uvula is midline and oropharynx is clear and moist.   Neck: Normal range of motion.   Cardiovascular: Normal rate and regular rhythm.    Pulmonary/Chest: Effort normal and breath sounds normal.   Musculoskeletal: Normal range of motion.   Neurological: She is alert and oriented to person, place, and time.   Skin: Skin is warm.   Psychiatric: She has a normal mood and affect. Her behavior is normal.       Assessment:       1. Acute sinusitis, recurrence not specified, unspecified location    2. Cough        Plan:           Acute sinusitis, recurrence not  specified, unspecified location  Comments:  Take Xyzal daily for about 7-10 days.  Take antibiotics as prescribed.  Hydrate and rest.   Orders:  -     amoxicillin-clavulanate 875-125mg (AUGMENTIN) 875-125 mg per tablet; Take 1 tablet by mouth 2 (two) times daily. Take with food  Dispense: 14 tablet; Refill: 0    Cough  -     benzonatate (TESSALON) 100 MG capsule; Take 1 capsule (100 mg total) by mouth 3 (three) times daily as needed.  Dispense: 30 capsule; Refill: 0        Instructed to take all medications as ordered.  Informed if no improvement or symptoms worsens to follow up with primary care physician.  Informed to hydrate and rest.  Printed and review after visit summary with patient.

## 2018-08-10 ENCOUNTER — OFFICE VISIT (OUTPATIENT)
Dept: OPHTHALMOLOGY | Facility: CLINIC | Age: 73
End: 2018-08-10
Payer: COMMERCIAL

## 2018-08-10 DIAGNOSIS — H10.13 ALLERGIC CONJUNCTIVITIS OF BOTH EYES: Primary | ICD-10-CM

## 2018-08-10 DIAGNOSIS — Z96.1 PSEUDOPHAKIA: ICD-10-CM

## 2018-08-10 PROCEDURE — 92014 COMPRE OPH EXAM EST PT 1/>: CPT | Mod: S$GLB,,, | Performed by: OPHTHALMOLOGY

## 2018-08-10 PROCEDURE — 99999 PR PBB SHADOW E&M-EST. PATIENT-LVL II: CPT | Mod: PBBFAC,,, | Performed by: OPHTHALMOLOGY

## 2018-08-10 RX ORDER — FLUCONAZOLE 150 MG/1
TABLET ORAL
Refills: 0 | COMMUNITY
Start: 2018-07-10 | End: 2018-10-15

## 2018-08-10 RX ORDER — LIOTHYRONINE SODIUM 5 UG/1
TABLET ORAL
Refills: 0 | COMMUNITY
Start: 2018-06-22

## 2018-08-10 RX ORDER — OLOPATADINE HYDROCHLORIDE 2 MG/ML
1 SOLUTION/ DROPS OPHTHALMIC DAILY PRN
Qty: 1 BOTTLE | Refills: 6 | Status: SHIPPED | OUTPATIENT
Start: 2018-08-10 | End: 2019-03-17 | Stop reason: SDUPTHER

## 2018-08-10 NOTE — PROGRESS NOTES
SUBJECTIVE:   Saskia Torres is a 72 y.o. female   Uncorrected distance visual acuity was 20/25 -2 in the right eye and 20/25 -2 in the left eye.   Chief Complaint   Patient presents with    Eye Exam        HPI:  HPI     Pt here for routine eye exam. No pain, but pt says she has been feeling   like something is in her eye. Pt states that she has been experiencing   some blurry vision for about a month now. No gtts.     1. PCIOL OS Toric 3/18/15  PCIOL OD Toric   2. H/O hayfever conjunctivitis    Pataday PRN  Famciclovir 500 mg BID    Last edited by Ephraim Vargas, Patient Care Assistant on 8/10/2018  8:38   AM. (History)        Assessment /Plan :  1. Allergic conjunctivitis of both eyes recommend Pataday qd OU prn   2. Pseudophakia  -- Condition stable, no therapeutic change required. Monitoring routinely.       RTC in 1 year or prn any changes

## 2018-09-14 ENCOUNTER — CLINICAL SUPPORT (OUTPATIENT)
Dept: AUDIOLOGY | Facility: CLINIC | Age: 73
End: 2018-09-14
Payer: COMMERCIAL

## 2018-09-14 ENCOUNTER — OFFICE VISIT (OUTPATIENT)
Dept: OTOLARYNGOLOGY | Facility: CLINIC | Age: 73
End: 2018-09-14
Payer: COMMERCIAL

## 2018-09-14 DIAGNOSIS — J30.89 PERENNIAL ALLERGIC RHINITIS: ICD-10-CM

## 2018-09-14 DIAGNOSIS — R49.9 HOARSENESS OR CHANGING VOICE: ICD-10-CM

## 2018-09-14 DIAGNOSIS — H92.01 OTALGIA, RIGHT EAR: Primary | ICD-10-CM

## 2018-09-14 DIAGNOSIS — H92.02 OTALGIA, LEFT EAR: Primary | ICD-10-CM

## 2018-09-14 DIAGNOSIS — R09.A2 GLOBUS SENSATION: ICD-10-CM

## 2018-09-14 PROCEDURE — 99999 PR PBB SHADOW E&M-EST. PATIENT-LVL III: CPT | Mod: PBBFAC,,, | Performed by: PHYSICIAN ASSISTANT

## 2018-09-14 PROCEDURE — 1101F PT FALLS ASSESS-DOCD LE1/YR: CPT | Mod: CPTII,S$GLB,, | Performed by: PHYSICIAN ASSISTANT

## 2018-09-14 PROCEDURE — 99213 OFFICE O/P EST LOW 20 MIN: CPT | Mod: S$GLB,,, | Performed by: PHYSICIAN ASSISTANT

## 2018-09-14 PROCEDURE — 92567 TYMPANOMETRY: CPT | Mod: S$GLB,,, | Performed by: AUDIOLOGIST-HEARING AID FITTER

## 2018-09-14 NOTE — PROGRESS NOTES
"Subjective:   Patient: Saskia Torres [id number], :1945   Visit date:2018 9:00 AM    Chief Complaint:  Otalgia    HPI:  Saskia is a 72 y.o. female who is here for evaluation of left otalgia x 4 days. She was last here with Dr. Szymanski in 2017; allergy testing at that time was negative.  She complains of intermittent sharp pains in her left ear over past few days.  Denies exacerbating or alleviating factors.   Denies ear drainage or recent changes in her hearing.  Denies fever or dizziness.  She says that ear "feels clogged" at times and she's concerned she may have wax.  They used mineral oil last night in that ear with no results.  Denies recent dental work.  She has chronic sinus issues.  She always has postnasal drip and clear nasal drainage.  Denies significant congestion or nasal obstruction at this time.  She has Flonase but not using daily.  Her eyes are watery.  She describes hoarseness on and off for past year.  Also gets recurrent globus sensation.  Denies dysphagia with solids or liquids.  Denies choking or respiratory distress.  She does not smoke.          Review of Systems:  -     Allergic/Immunologic: is allergic to no known drug allergies..  -     Constitutional: Current temp:      Her meds, allergies, medical, surgical, social & family histories were reviewed & updated:  -     She has a current medication list which includes the following prescription(s): ezetimibe-simvastatin 10-20 mg, fluconazole, fluticasone, furosemide, linzess, liothyronine, olopatadine, potassium chloride sa, trazodone, and vortioxetine.  -     She  has a past medical history of Cataract, Hyperlipidemia, Hypertension, Hypothyroid, Insulin overdose (2014), and Interstitial cystitis.   -     She does not have any pertinent problems on file.   -     She  has a past surgical history that includes  section; Hysterectomy; Oophorectomy; Cataract extraction w/  intraocular lens implant (Right, OD 14); " Cataract extraction w/  intraocular lens implant (Left, 3-18-15); and COLONOSCOPY (N/A, 2/24/2015).  -     She  reports that she has quit smoking. she has never used smokeless tobacco. She reports that she drinks alcohol. She reports that she does not use drugs.  -     Her family history includes Hyperlipidemia in her father; Hypertension in her father.  -     She is allergic to no known drug allergies.    Objective:     Physical Exam:  Vitals:  There were no vitals taken for this visit.  Appearance:  Well-developed, well-nourished.  Communication:  Able to communicate, no hoarseness.  Head & Face:  Normocephalic, atraumatic, no sinus tenderness, normal facial strength.  Eyes:  Extraocular motions intact.  Ears:  Otoscopy of external auditory canals and tympanic membranes was normal, clinical speech reception thresholds grossly intact, no mass/lesion of auricle.  No erythema of TM's; no middle ear fluid.  Nose:  No masses/lesions of external nose, nasal mucosa, septum, and turbinates were within normal limits.  Mouth:  No mass/lesion of lips, teeth, gums, hard/soft palate, tongue, tonsils, or oropharynx.  Neck & Lymphatics:  No cervical lymphadenopathy, no neck mass/crepitus/ asymmetry, trachea is midline, no thyroid enlargement/tenderness/mass.  Neuro/Psych: Alert with normal mood and affect.   Abdominal: Normal appearance.   Respiration/Chest:  Symmetric expansion during respiration, normal respiratory effort.  Skin:  Warm and intact  Cardiovascular:  No peripheral vascular edema or varicosities.      Tympanograms today:  Type A normal AU      Assessment & Plan:   Saskia was seen today for otalgia.    Diagnoses and all orders for this visit:    Otalgia, left ear    Perennial allergic rhinitis    Globus sensation    Hoarseness or changing voice      Reassured patient today that her ear exam is normal.  Her tympanograms are normal as well.  No middle ear fluid, no signs of OE and no cerumen impactions.  We discussed  that TMJ arthralgia and ETD can also cause otalgia.  We had a long discussion regarding the anatomy and function of the eustachian tube.  We discussed that the eustachian tube acts as a pump to keep the appropriate amount of pressure behind the ear drum.  Recommend she use her nasal steroid spray on a daily basis, and we discussed that it will take 2-3 weeks of daily use to achieve maximal effectiveness.  We also discussed that PND and reflux can cause throat irritation, voice changes and globus sensation.  Recommend she RTC with Dr. Szymanski for scope examination given the duration of her symptoms (past 12 months).

## 2018-09-14 NOTE — PROGRESS NOTES
Tympanograms completed today (9/14/22018) per Lucy Valentin PA-C, due to c/o ear pressure bilaterally.    Right Ear: Type A  .43ml@1daPa  ECV: 1.17ml    Left Ear: Type A  .34ml@11daPa  ECV: 1.08ml

## 2018-09-19 ENCOUNTER — OFFICE VISIT (OUTPATIENT)
Dept: OTOLARYNGOLOGY | Facility: CLINIC | Age: 73
End: 2018-09-19
Payer: COMMERCIAL

## 2018-09-19 VITALS
BODY MASS INDEX: 23.18 KG/M2 | TEMPERATURE: 98 F | WEIGHT: 135.81 LBS | HEART RATE: 77 BPM | DIASTOLIC BLOOD PRESSURE: 69 MMHG | HEIGHT: 64 IN | SYSTOLIC BLOOD PRESSURE: 134 MMHG

## 2018-09-19 DIAGNOSIS — H92.02 REFERRED OTALGIA OF LEFT EAR: Primary | ICD-10-CM

## 2018-09-19 DIAGNOSIS — R09.82 POST-NASAL DRIP: ICD-10-CM

## 2018-09-19 DIAGNOSIS — R09.A2 GLOBUS SENSATION: ICD-10-CM

## 2018-09-19 PROCEDURE — 3075F SYST BP GE 130 - 139MM HG: CPT | Mod: CPTII,S$GLB,, | Performed by: OTOLARYNGOLOGY

## 2018-09-19 PROCEDURE — 1101F PT FALLS ASSESS-DOCD LE1/YR: CPT | Mod: CPTII,S$GLB,, | Performed by: OTOLARYNGOLOGY

## 2018-09-19 PROCEDURE — 99999 PR PBB SHADOW E&M-EST. PATIENT-LVL III: CPT | Mod: PBBFAC,,, | Performed by: OTOLARYNGOLOGY

## 2018-09-19 PROCEDURE — 31575 DIAGNOSTIC LARYNGOSCOPY: CPT | Mod: S$GLB,,, | Performed by: OTOLARYNGOLOGY

## 2018-09-19 PROCEDURE — 99214 OFFICE O/P EST MOD 30 MIN: CPT | Mod: 25,S$GLB,, | Performed by: OTOLARYNGOLOGY

## 2018-09-19 PROCEDURE — 3078F DIAST BP <80 MM HG: CPT | Mod: CPTII,S$GLB,, | Performed by: OTOLARYNGOLOGY

## 2018-09-19 RX ORDER — AZELASTINE 1 MG/ML
2 SPRAY, METERED NASAL 2 TIMES DAILY
Qty: 30 ML | Refills: 12 | Status: SHIPPED | OUTPATIENT
Start: 2018-09-19 | End: 2018-10-15

## 2018-09-19 NOTE — PROGRESS NOTES
Referring Provider:    No referring provider defined for this encounter.  Subjective:   Patient: Saskia Torres [id number], :1945   Visit date:2018 11:52 PM    Chief Complaint:  Other ( Left ear pain and sinus problems)    HPI:  Saskia is a 72 y.o. female who I was asked to see in consultation for evaluation of a sinus problem and left ear pain:      Sinonasal / Allergy: Saskia has no significant nasal obstruction. She reports the the following sinonasal symptoms: headaches.  She denies the the following sinonasal symptoms: asthma, epistaxis, post-nasal drip, reduced sense of smell, nasal obstruction, rhinorrhea (runny nose), significant history of dental procedure just prior to the onset of sinus problems, significant history of prior facial trauma or fractures and history of sinonasal surgery. She has been  on medications for these symptoms. Medications: Flonase which has been ineffective.    She has had 4 or 5 sinus infections in the past 12 months.  There has been a recent imaging study of the sinuses (none, sinus plain films and CTA of the head that demonstrated normal sinuses).    She has no allergic rhinitis with symptoms including eye itchiness, eye watering, nasal congestion, nasal itchiness, nasal rhinorrhea, sneezing and wheezing.      Allergy testing for this patient was done- no atopy    Current smoker:  No    Ear  Left ear pain.  Intermittent.  No drainage.  No changes in hearing.       Review of Systems:  -     Allergic/Immunologic: is allergic to no known drug allergies..  -     Constitutional: Current temp: 97.9 °F (36.6 °C) (Tympanic)      Her meds, allergies, medical, surgical, social & family histories were reviewed & updated:  -     She has a current medication list which includes the following prescription(s): ezetimibe-simvastatin 10-20 mg, fluconazole, fluticasone, furosemide, linzess, liothyronine, olopatadine, potassium chloride sa, trazodone, vortioxetine, and azelastine.  -    "  She  has a past medical history of Cataract, Hyperlipidemia, Hypertension, Hypothyroid, Insulin overdose (2014), and Interstitial cystitis.   -     She does not have any pertinent problems on file.   -     She  has a past surgical history that includes  section; Hysterectomy; Oophorectomy; Cataract extraction w/  intraocular lens implant (Right, OD 14); Cataract extraction w/  intraocular lens implant (Left, 3-18-15); and COLONOSCOPY (N/A, 2015).  -     She  reports that she has quit smoking. she has never used smokeless tobacco. She reports that she drinks alcohol. She reports that she does not use drugs.  -     Her family history includes Hyperlipidemia in her father; Hypertension in her father.  -     She is allergic to no known drug allergies.    Objective:   Physical Exam:  Vitals:  /69   Pulse 77   Temp 97.9 °F (36.6 °C) (Tympanic)   Ht 5' 4" (1.626 m)   Wt 61.6 kg (135 lb 12.9 oz)   BMI 23.31 kg/m²   General appearance:  Well developed, well nourished    Eyes:  Extraocular motions intact, PERRL    Communication:  no hoarseness, no dysphonia    Ears:  Otoscopy of external auditory canals and tympanic membranes was normal, clinical speech reception thresholds grossly intact, no mass/lesion of auricle.  Nose:  No masses/lesions of external nose, nasal mucosa, septum, and turbinates were within normal limits.  Mouth:  No mass/lesion of lips, teeth, gums, hard/soft palate, tongue, tonsils, or oropharynx.  Pain to palpation of left TMJ    Cardiovascular:  No pedal edema; Radial Pulses +2     Neck & Lymphatics:  No cervical lymphadenopathy, no neck mass/crepitus/ asymmetry, trachea is midline, no thyroid enlargement/tenderness/mass.    Psych: Oriented x3,  Alert with normal mood and affect.     Respiration/Chest:  Symmetric expansion during respiration, normal respiratory effort.    Skin:  Warm and intact. No ulcerations of face, scalp, neck.      Assessment & Plan:   Headaches- non " sinonasal in origin.  Recommend neurology eval  Referred otalgia- recommend DMD eval for TMJ arthralgia            -------------------------------------------------------------------------------------------------------------------        Endoscopic Exam:  Patient: Saskia Torres [id number], :1945  Procedure date:2018  Patient's medications, allergies, past medical, surgical, social and family histories were reviewed and updated as appropriate.  Chief Complaint:  Other ( Left ear pain and sinus problems)    HPI:  Saskia is a 72 y.o. female with the history of present illness as discussed in the clinic note from today.    Procedure: Risks, benefits, and alternatives of the procedure were discussed with the patient, and the patient consented to the nasal endoscopy.  The nasal cavity was sprayed with a topical decongestant and anesthetic (if needed). The endoscope was passed into each nostril and each nasal cavity was visualized.  On each side the nasal cavity, sinuses (if open), turbinates, and septum were examined with the findings described below. At the end of the examination, the scope was removed. The patient tolerated the procedure well with no complications.       Endoscopic Exam Findings:  -     The right side has normal mucosa  -     The left side has normal mucosa  -     Nasal secretions: No discolored secretions noted bilaterally  -     Nasal septum: no significant deviation or perforation appreciated   -     Inferior turbinate: Normal mucosa without significant hypertrophy bilaterally  -     Middle turbinate: Normal mucosa without significant hypertrophy bilaterally  -     Other findings: No mass or obstructive lesion    -     Laryngeal mucosa is normal  -     Posterior commissure has no hypertrophy  -     Lingual tonsils have no hypertrophy  -     Adenoids have no  hypertrophy  -     Right vocal fold: normal mobility     mass/lesion: none  -     Left vocal fold: normal  mobility     mass/lesion: none  -     Other findings: none  Assessment & Plan:  - see today's clinic note

## 2018-09-20 ENCOUNTER — PATIENT OUTREACH (OUTPATIENT)
Dept: ADMINISTRATIVE | Facility: HOSPITAL | Age: 73
End: 2018-09-20

## 2018-09-20 NOTE — PROGRESS NOTES
Contacted patient to schedule annual appointment.   Patient stated she would like to be contacted again around 10/03/18. That's went she'll be back in town.

## 2018-09-25 NOTE — PATIENT INSTRUCTIONS
Sinusitis (Antibiotic Treatment)    The sinuses are air-filled spaces within the bones of the face. They connect to the inside of the nose. Sinusitis is an inflammation of the tissue lining the sinus cavity. Sinus inflammation can occur during a cold. It can also be due to allergies to pollens and other particles in the air. Sinusitis can cause symptoms of sinus congestion and fullness. A sinus infection causes fever, headache and facial pain. There is often green or yellow drainage from the nose or into the back of the throat (post-nasal drip). You have been given antibiotics to treat this condition.  Home care:  · Take the full course of antibiotics as instructed. Do not stop taking them, even if you feel better.  · Drink plenty of water, hot tea, and other liquids. This may help thin mucus. It also may promote sinus drainage.  · Heat may help soothe painful areas of the face. Use a towel soaked in hot water. Or,  the shower and direct the hot spray onto your face. Using a vaporizer along with a menthol rub at night may also help.   · An expectorant containing guaifenesin may help thin the mucus and promote drainage from the sinuses.  · Over-the-counter decongestants may be used unless a similar medicine was prescribed. Nasal sprays work the fastest. Use one that contains phenylephrine or oxymetazoline. First blow the nose gently. Then use the spray. Do not use these medicines more often than directed on the label or symptoms may get worse. You may also use tablets containing pseudoephedrine. Avoid products that combine ingredients, because side effects may be increased. Read labels. You can also ask the pharmacist for help. (NOTE: Persons with high blood pressure should not use decongestants. They can raise blood pressure.)  · Over-the-counter antihistamines may help if allergies contributed to your sinusitis.    · Do not use nasal rinses or irrigation during an acute sinus infection, unless told to by  REVIEW OF SYSTEMS:  GENERAL:  Patient denies fevers chills night sweats anorexia weight loss weakness hot flashes but complains of:    excessive fatigue  EYES:  Patient denies blurred vision double vision burning & itching but complains of:  pain.   NEUROLOGIC:  Patient denies headache dizziness numbness tingling insomnia but complains of:   loss of  balance.  GASTROINTESTINAL:  Patient denies nausea, vomiting, diarrhea, abdominal pain, constipation, blood in stool, indigestion/heartburn.  CARDIOVASCULAR:  Patient denies chest pain, palpitations.  SKIN:  Patient denies skin rashes, bruising, persistent itching.  MUSCULOSKELETAL:  Patient denies bone pain, but complains of:  joint pain and leg and ankle swelling.  ENT/MOUTH:  Patient denies dysphagia, dry mouth, sore throat, sores on tongue, mouth sores, sinus drainage, hearing loss  :  Patient denies urgency, painful urination, urinary frequency, blood in urine, nocturia  RESPIRATORY:  Patient denies wheezing, shortness of breath, but complains of frequent cough.  PSYCH: Patient denies anxiety, depression   your health care provider. Rinsing may spread the infection to other sinuses.  · Use acetaminophen or ibuprofen to control pain, unless another pain medicine was prescribed. (If you have chronic liver or kidney disease or ever had a stomach ulcer, talk with your doctor before using these medicines. Aspirin should never be used in anyone under 18 years of age who is ill with a fever. It may cause severe liver damage.)  · Don't smoke. This can worsen symptoms.  Follow-up care  Follow up with your healthcare provider or our staff if you are not improving within the next week.  When to seek medical advice  Call your healthcare provider if any of these occur:  · Facial pain or headache becoming more severe  · Stiff neck  · Unusual drowsiness or confusion  · Swelling of the forehead or eyelids  · Vision problems, including blurred or double vision  · Fever of 100.4ºF (38ºC) or higher, or as directed by your healthcare provider  · Seizure  · Breathing problems  · Symptoms not resolving within 10 days  Date Last Reviewed: 4/13/2015  © 0086-7240 [employer]. [address] [place] [address], [place] [address]. All rights reserved. This information is not intended as a substitute for professional medical care. Always follow your healthcare professional's instructions.

## 2018-10-15 ENCOUNTER — OFFICE VISIT (OUTPATIENT)
Dept: INTERNAL MEDICINE | Facility: CLINIC | Age: 73
End: 2018-10-15
Payer: COMMERCIAL

## 2018-10-15 VITALS
BODY MASS INDEX: 23.18 KG/M2 | WEIGHT: 135.81 LBS | TEMPERATURE: 97 F | SYSTOLIC BLOOD PRESSURE: 122 MMHG | DIASTOLIC BLOOD PRESSURE: 62 MMHG | HEIGHT: 64 IN

## 2018-10-15 DIAGNOSIS — J32.9 SINUSITIS, UNSPECIFIED CHRONICITY, UNSPECIFIED LOCATION: Primary | ICD-10-CM

## 2018-10-15 PROCEDURE — 90662 IIV NO PRSV INCREASED AG IM: CPT | Mod: S$GLB,,, | Performed by: FAMILY MEDICINE

## 2018-10-15 PROCEDURE — 1101F PT FALLS ASSESS-DOCD LE1/YR: CPT | Mod: CPTII,S$GLB,, | Performed by: FAMILY MEDICINE

## 2018-10-15 PROCEDURE — 99213 OFFICE O/P EST LOW 20 MIN: CPT | Mod: 25,S$GLB,, | Performed by: FAMILY MEDICINE

## 2018-10-15 PROCEDURE — 90471 IMMUNIZATION ADMIN: CPT | Mod: S$GLB,,, | Performed by: FAMILY MEDICINE

## 2018-10-15 PROCEDURE — 90670 PCV13 VACCINE IM: CPT | Mod: S$GLB,,, | Performed by: FAMILY MEDICINE

## 2018-10-15 PROCEDURE — 99999 PR PBB SHADOW E&M-EST. PATIENT-LVL III: CPT | Mod: PBBFAC,,, | Performed by: FAMILY MEDICINE

## 2018-10-15 PROCEDURE — 3074F SYST BP LT 130 MM HG: CPT | Mod: CPTII,S$GLB,, | Performed by: FAMILY MEDICINE

## 2018-10-15 PROCEDURE — 90472 IMMUNIZATION ADMIN EACH ADD: CPT | Mod: S$GLB,,, | Performed by: FAMILY MEDICINE

## 2018-10-15 PROCEDURE — 3078F DIAST BP <80 MM HG: CPT | Mod: CPTII,S$GLB,, | Performed by: FAMILY MEDICINE

## 2018-10-15 RX ORDER — LISINOPRIL 40 MG/1
TABLET ORAL
Refills: 2 | COMMUNITY
Start: 2018-10-03

## 2018-10-15 RX ORDER — LUBIPROSTONE 24 UG/1
CAPSULE, GELATIN COATED ORAL
Refills: 0 | COMMUNITY
Start: 2018-10-03 | End: 2021-10-04

## 2018-10-15 NOTE — PROGRESS NOTES
Subjective:       Patient ID: Saskia Torres is a 72 y.o. female.    Chief Complaint: No chief complaint on file.    HPI **5 Days of nasal congestion postnasal drip cough without fever shortness of breath or malaise. No  Over-the-counter cold medications used.not using astelin    Past Medical History:   Diagnosis Date    Cataract     Hyperlipidemia     dr lopez    Hypertension     Hypothyroid     dr lopez    Insulin overdose 2014    Given 100U insulin at [facility] () accidentally instead of estrogen depot    Interstitial cystitis      Past Surgical History:   Procedure Laterality Date    CATARACT EXTRACTION W/  INTRAOCULAR LENS IMPLANT Right OD 14    CATARACT EXTRACTION W/  INTRAOCULAR LENS IMPLANT Left 3-18-15     SECTION      COLONOSCOPY N/A 2015    [facility]win [facility]    HYSTERECTOMY      bleeding    OOPHORECTOMY      one only     Family History   Problem Relation Age of Onset    Hypertension Father     Hyperlipidemia Father      Social History     Socioeconomic History    Marital status:      Spouse name: None    Number of children: 3    Years of education: None    Highest education level: None   Social Needs    Financial resource strain: None    Food insecurity - worry: None    Food insecurity - inability: None    Transportation needs - medical: None    Transportation needs - non-medical: None   Occupational History    None   Tobacco Use    Smoking status: Former Smoker    Smokeless tobacco: Never Used   Substance and Sexual Activity    Alcohol use: Yes     Comment: socially     Drug use: No    Sexual activity: None   Other Topics Concern    None   Social History Narrative    None           Review of Systems  Cardiovascular: no chest pain  Chest: no shortness of breath  Abd: no abd pain  Remainder review of systems negative    Objective:      Physical Exam   Constitutional: She is oriented to person,  place, and time. She appears well-developed and well-nourished. No distress.   HENT:   Head: Atraumatic.   Right Ear: External ear normal.   Left Ear: External ear normal.   Nose: Nose normal.   Mouth/Throat: Oropharynx is clear and moist. No oropharyngeal exudate.   Nl tms   Eyes: Conjunctivae and EOM are normal. Pupils are equal, round, and reactive to light. No scleral icterus.   Neck: Normal range of motion. Neck supple. No thyromegaly present.   Cardiovascular: Normal rate, regular rhythm and normal heart sounds.   No murmur heard.  Pulmonary/Chest: Effort normal and breath sounds normal. No respiratory distress. She has no wheezes. She has no rales.   Lymphadenopathy:     She has no cervical adenopathy.   Neurological: She is alert and oriented to person, place, and time. No cranial nerve deficit. She exhibits normal muscle tone. Coordination normal.   Skin: Skin is warm. No rash noted. No erythema. No pallor.   Psychiatric: She has a normal mood and affect. Her behavior is normal. Judgment and thought content normal.   Nursing note and vitals reviewed.      Assessment:       1. Sinusitis, unspecified chronicity, unspecified location        Plan:       **If no better 3-4 days followup sooner if any worsening or change in symptoms or lack of resolution  Sinusitis, unspecified chronicity, unspecified location    Other orders  -     (In Office Administered) Pneumococcal Conjugate Vaccine (13 Valent) (IM)    flu shot*    Shingrix new shingles vaccine  via a pharmacy  Safe otc meds discussed

## 2018-11-05 ENCOUNTER — HOSPITAL ENCOUNTER (OUTPATIENT)
Dept: RADIOLOGY | Facility: HOSPITAL | Age: 73
Discharge: HOME | End: 2018-11-05
Attending: FAMILY MEDICINE
Payer: COMMERCIAL

## 2018-11-05 ENCOUNTER — OFFICE VISIT (OUTPATIENT)
Dept: INTERNAL MEDICINE | Facility: CLINIC | Age: 73
End: 2018-11-05
Payer: COMMERCIAL

## 2018-11-05 VITALS
WEIGHT: 132.06 LBS | HEART RATE: 89 BPM | SYSTOLIC BLOOD PRESSURE: 108 MMHG | OXYGEN SATURATION: 98 % | DIASTOLIC BLOOD PRESSURE: 70 MMHG | BODY MASS INDEX: 22.55 KG/M2 | TEMPERATURE: 99 F | HEIGHT: 64 IN

## 2018-11-05 DIAGNOSIS — R05.9 COUGH: ICD-10-CM

## 2018-11-05 DIAGNOSIS — R05.9 COUGH: Primary | ICD-10-CM

## 2018-11-05 PROCEDURE — 1101F PT FALLS ASSESS-DOCD LE1/YR: CPT | Mod: CPTII,S$GLB,, | Performed by: FAMILY MEDICINE

## 2018-11-05 PROCEDURE — 71046 X-RAY EXAM CHEST 2 VIEWS: CPT | Mod: TC,FY,PO

## 2018-11-05 PROCEDURE — 99213 OFFICE O/P EST LOW 20 MIN: CPT | Mod: S$GLB,,, | Performed by: FAMILY MEDICINE

## 2018-11-05 PROCEDURE — 99999 PR PBB SHADOW E&M-EST. PATIENT-LVL IV: CPT | Mod: PBBFAC,,, | Performed by: FAMILY MEDICINE

## 2018-11-05 PROCEDURE — 3078F DIAST BP <80 MM HG: CPT | Mod: CPTII,S$GLB,, | Performed by: FAMILY MEDICINE

## 2018-11-05 PROCEDURE — 71046 X-RAY EXAM CHEST 2 VIEWS: CPT | Mod: 26,,, | Performed by: RADIOLOGY

## 2018-11-05 PROCEDURE — 3074F SYST BP LT 130 MM HG: CPT | Mod: CPTII,S$GLB,, | Performed by: FAMILY MEDICINE

## 2018-11-05 RX ORDER — DOXYCYCLINE 100 MG/1
100 CAPSULE ORAL 2 TIMES DAILY
Qty: 20 CAPSULE | Refills: 0 | Status: SHIPPED | OUTPATIENT
Start: 2018-11-05 | End: 2018-11-15

## 2018-11-05 NOTE — PROGRESS NOTES
Subjective:       Patient ID: Saskia Torres is a 72 y.o. female.    Chief Complaint: Follow-up    HPI *>2 weeks of nasal congestion postnasal drip prod cough without fever but some shortness of breath  malaise. No  Over-the-counter cold medications  Used.    Past Medical History:   Diagnosis Date    Cataract     Hyperlipidemia     dr lopez    Hypertension     Hypothyroid     dr lopez    Insulin overdose 2014    Given 100U insulin at [facility] () accidentally instead of estrogen depot    Interstitial cystitis      Past Surgical History:   Procedure Laterality Date    CATARACT EXTRACTION W/  INTRAOCULAR LENS IMPLANT Right OD 14    CATARACT EXTRACTION W/  INTRAOCULAR LENS IMPLANT Left 3-18-15     SECTION      COLONOSCOPY N/A 2015    [facility] at [facility] ENDO    HYSTERECTOMY      bleeding    OOPHORECTOMY      one only     Family History   Problem Relation Age of Onset    Hypertension Father     Hyperlipidemia Father      Social History     Socioeconomic History    Marital status:      Spouse name: None    Number of children: 3    Years of education: None    Highest education level: None   Social Needs    Financial resource strain: None    Food insecurity - worry: None    Food insecurity - inability: None    Transportation needs - medical: None    Transportation needs - non-medical: None   Occupational History    None   Tobacco Use    Smoking status: Former Smoker    Smokeless tobacco: Never Used   Substance and Sexual Activity    Alcohol use: Yes     Comment: socially     Drug use: No    Sexual activity: None   Other Topics Concern    None   Social History Narrative    None         Review of Systems    Objective:      Physical Exam   Constitutional: She is oriented to person, place, and time. She appears well-developed and well-nourished. No distress.   HENT:   Head: Atraumatic.   Right Ear: External ear normal.   Left  Ear: External ear normal.   Nose: Nose normal.   Mouth/Throat: Oropharynx is clear and moist. No oropharyngeal exudate.   Nl tms   Eyes: Conjunctivae and EOM are normal. Pupils are equal, round, and reactive to light. No scleral icterus.   Neck: Normal range of motion. Neck supple. No thyromegaly present.   Cardiovascular: Normal rate, regular rhythm and normal heart sounds.   No murmur heard.  Pulmonary/Chest: Effort normal and breath sounds normal. No respiratory distress. She has no wheezes. She has no rales.   Lymphadenopathy:     She has no cervical adenopathy.   Neurological: She is alert and oriented to person, place, and time. No cranial nerve deficit. She exhibits normal muscle tone. Coordination normal.   Skin: Skin is warm. No rash noted. No erythema. No pallor.   Psychiatric: She has a normal mood and affect. Her behavior is normal. Judgment and thought content normal.   Nursing note and vitals reviewed.      Assessment:       1. Cough        Plan:       *Cough  -     X-Ray Chest PA And Lateral; Future; Expected date: 11/05/2018    Other orders  -     doxycycline (VIBRAMYCIN) 100 MG Cap; Take 1 capsule (100 mg total) by mouth 2 (two) times daily. for 10 days  Dispense: 20 capsule; Refill: 0    **  If no better 3-4 days followup sooner if any worsening or change in symptoms or lack of resolution

## 2018-12-03 DIAGNOSIS — Z12.39 BREAST CANCER SCREENING: ICD-10-CM

## 2018-12-18 ENCOUNTER — OFFICE VISIT (OUTPATIENT)
Dept: INTERNAL MEDICINE | Facility: CLINIC | Age: 73
End: 2018-12-18
Payer: COMMERCIAL

## 2018-12-18 VITALS
SYSTOLIC BLOOD PRESSURE: 100 MMHG | HEIGHT: 64 IN | TEMPERATURE: 99 F | HEART RATE: 99 BPM | WEIGHT: 131.06 LBS | OXYGEN SATURATION: 96 % | BODY MASS INDEX: 22.38 KG/M2 | DIASTOLIC BLOOD PRESSURE: 66 MMHG

## 2018-12-18 DIAGNOSIS — R05.9 COUGH: ICD-10-CM

## 2018-12-18 DIAGNOSIS — J01.90 ACUTE SINUSITIS, RECURRENCE NOT SPECIFIED, UNSPECIFIED LOCATION: Primary | ICD-10-CM

## 2018-12-18 PROCEDURE — 1101F PT FALLS ASSESS-DOCD LE1/YR: CPT | Mod: CPTII,S$GLB,, | Performed by: NURSE PRACTITIONER

## 2018-12-18 PROCEDURE — 99999 PR PBB SHADOW E&M-EST. PATIENT-LVL IV: CPT | Mod: PBBFAC,,, | Performed by: NURSE PRACTITIONER

## 2018-12-18 PROCEDURE — 3074F SYST BP LT 130 MM HG: CPT | Mod: CPTII,S$GLB,, | Performed by: NURSE PRACTITIONER

## 2018-12-18 PROCEDURE — 3078F DIAST BP <80 MM HG: CPT | Mod: CPTII,S$GLB,, | Performed by: NURSE PRACTITIONER

## 2018-12-18 PROCEDURE — 99214 OFFICE O/P EST MOD 30 MIN: CPT | Mod: S$GLB,,, | Performed by: NURSE PRACTITIONER

## 2018-12-18 RX ORDER — PREDNISONE 10 MG/1
10 TABLET ORAL DAILY
Qty: 5 TABLET | Refills: 0 | Status: SHIPPED | OUTPATIENT
Start: 2018-12-18 | End: 2018-12-23

## 2018-12-18 RX ORDER — GUAIFENESIN 600 MG/1
600 TABLET, EXTENDED RELEASE ORAL 2 TIMES DAILY
Qty: 20 TABLET | Refills: 0 | COMMUNITY
Start: 2018-12-18 | End: 2018-12-28

## 2018-12-18 RX ORDER — BENZONATATE 100 MG/1
100 CAPSULE ORAL 3 TIMES DAILY PRN
Qty: 30 CAPSULE | Refills: 0 | Status: SHIPPED | OUTPATIENT
Start: 2018-12-18 | End: 2018-12-28

## 2018-12-18 NOTE — PROGRESS NOTES
CHIEF COMPLAINT/REASON FOR VISIT: nasal congestion, post nasal drip, sore throat, facial pressure    HISTORY OF PRESENT ILLNESS:    Saskia Torres.  73 y.o.  female complains of a sinus issue with nasal congestion, post nasal drip, sore throat, fever, chills, facial pressure, and productive cough onset 3 weeks ago. Patient admits tried OTC medications with little relief.     Had Doxy  and Augmentin 2018    PAST MEDICAL HISTORY:   Past Medical History:   Diagnosis Date    Cataract     Hyperlipidemia     dr lopez    Hypertension     Hypothyroid     dr lopez    Insulin overdose 2014    Given 100U insulin at [facility] () accidentally instead of estrogen depot    Interstitial cystitis          PAST SURGICAL HISTORY:.  Past Surgical History:   Procedure Laterality Date    CATARACT EXTRACTION W/  INTRAOCULAR LENS IMPLANT Right OD 14    CATARACT EXTRACTION W/  INTRAOCULAR LENS IMPLANT Left 3-18-15     SECTION      COLONOSCOPY N/A 2015    Performed by Irwin [facility] III[facility] at [facility] [place] ENDO    HYSTERECTOMY      bleeding    OOPHORECTOMY      one only         SOCIAL HISTORY:.  Social History     Socioeconomic History    Marital status:      Spouse name: Not on file    Number of children: 3    Years of education: Not on file    Highest education level: Not on file   Social Needs    Financial resource strain: Not on file    Food insecurity - worry: Not on file    Food insecurity - inability: Not on file    Transportation needs - medical: Not on file    Transportation needs - non-medical: Not on file   Occupational History    Not on file   Tobacco Use    Smoking status: Former Smoker    Smokeless tobacco: Never Used   Substance and Sexual Activity    Alcohol use: Yes     Comment: socially     Drug use: No    Sexual activity: Not on file   Other Topics Concern    Not on file   Social History Narrative    Not on file       ROS:  GENERAL: Reports no  "fever, chills.  SKIN: No rashes, itching or changes in color or texture of skin.  HEENT: Reports sinus pressure, nasal congestion, postnasal drip, sore throat, rhinorrhea.  CHEST: Reports cough and sputum production.  Denies shortness of breath and wheezing.  CARDIOVASCULAR: Denies chest pain, PND, orthopnea or reduced exercise tolerance.  ABDOMEN: Appetite fine. No weight loss.  MUSCULOSKELETAL: No joint stiffness, pain or swelling. Denies back pain.  NEUROLOGIC: Report headaches. No history of seizures, paralysis, alteration of gait or coordination.  PSYCHIATRIC: Denies mood swings, depression or suicidal thoughts.    /66 (BP Location: Left arm, Patient Position: Sitting, BP Method: Medium (Manual))   Pulse 99   Temp 98.5 °F (36.9 °C) (Tympanic)   Ht 5' 4" (1.626 m)   Wt 59.5 kg (131 lb 1 oz)   SpO2 96%   BMI 22.50 kg/m² .    PE:   APPEARANCE: Well nourished, well developed, in mild distress.   SKIN: Normal skin turgor, no lesions.  HEENT: Turbinates red and enlarge, sinus tenderness on palpation, erythematous pharynx, TMs poor light reflex bilaterally.  CHEST: Lungs clear to auscultation. no wheezing  CARDIOVASCULAR: Regular rate and rhythm.  MUSCULOSKELETAL: Motor: 5/5 strength major flexors/extensors.    PLAN:   Advise Hydrate and rest.    Practice good handwashing.  Mucinex for cough and chest congestion.  See PCP or go to ER if symptoms worsen or fail to improve with treatment.        DIAGNOSIS:  Acute sinusitis, recurrence not specified, unspecified location  Comments:  Hydrate and rest.  Take Mucinex (plain) over the counter   Orders:  -     predniSONE (DELTASONE) 10 MG tablet; Take 1 tablet (10 mg total) by mouth once daily. for 5 days  Dispense: 5 tablet; Refill: 0    Cough  -     benzonatate (TESSALON) 100 MG capsule; Take 1 capsule (100 mg total) by mouth 3 (three) times daily as needed.  Dispense: 30 capsule; Refill: 0  -     predniSONE (DELTASONE) 10 MG tablet; Take 1 tablet (10 mg total) " by mouth once daily. for 5 days  Dispense: 5 tablet; Refill: 0    Other orders  -     guaiFENesin (MUCINEX) 600 mg 12 hr tablet; Take 1 tablet (600 mg total) by mouth 2 (two) times daily. for 10 days  Dispense: 20 tablet; Refill: 0        Instructed to take all medications as ordered.  Informed if no improvement or symptoms worsens to follow up with primary care physician.  Informed to hydrate and rest.  Printed and review after visit summary with patient.

## 2019-01-16 ENCOUNTER — TELEPHONE (OUTPATIENT)
Dept: OPHTHALMOLOGY | Facility: CLINIC | Age: 74
End: 2019-01-16

## 2019-01-16 NOTE — TELEPHONE ENCOUNTER
----- Message from Callie Guzmán sent at 1/16/2019  3:05 PM CST -----  Contact: pt  Pt is returning the nurse call in regards to the pt missed call   [phone] (home) about drops

## 2019-01-16 NOTE — TELEPHONE ENCOUNTER
Phoned pt to tell her that pataday wasn't approved by insurance, and that she could use Alaway or Zaditor.

## 2019-03-07 ENCOUNTER — OFFICE VISIT (OUTPATIENT)
Dept: INTERNAL MEDICINE | Facility: CLINIC | Age: 74
End: 2019-03-07
Payer: COMMERCIAL

## 2019-03-07 ENCOUNTER — HOSPITAL ENCOUNTER (OUTPATIENT)
Dept: RADIOLOGY | Facility: HOSPITAL | Age: 74
Discharge: HOME | End: 2019-03-07
Attending: FAMILY MEDICINE
Payer: COMMERCIAL

## 2019-03-07 VITALS
DIASTOLIC BLOOD PRESSURE: 66 MMHG | HEART RATE: 66 BPM | HEIGHT: 64 IN | SYSTOLIC BLOOD PRESSURE: 110 MMHG | WEIGHT: 129.19 LBS | RESPIRATION RATE: 18 BRPM | OXYGEN SATURATION: 98 % | TEMPERATURE: 98 F | BODY MASS INDEX: 22.06 KG/M2

## 2019-03-07 DIAGNOSIS — J32.9 SINUSITIS, UNSPECIFIED CHRONICITY, UNSPECIFIED LOCATION: ICD-10-CM

## 2019-03-07 DIAGNOSIS — J32.9 SINUSITIS, UNSPECIFIED CHRONICITY, UNSPECIFIED LOCATION: Primary | ICD-10-CM

## 2019-03-07 DIAGNOSIS — I10 ESSENTIAL HYPERTENSION: ICD-10-CM

## 2019-03-07 PROCEDURE — 99213 OFFICE O/P EST LOW 20 MIN: CPT | Mod: 25,S$GLB,, | Performed by: FAMILY MEDICINE

## 2019-03-07 PROCEDURE — 99999 PR PBB SHADOW E&M-EST. PATIENT-LVL V: CPT | Mod: PBBFAC,,, | Performed by: FAMILY MEDICINE

## 2019-03-07 PROCEDURE — 3078F PR MOST RECENT DIASTOLIC BLOOD PRESSURE < 80 MM HG: ICD-10-PCS | Mod: CPTII,S$GLB,, | Performed by: FAMILY MEDICINE

## 2019-03-07 PROCEDURE — 1101F PT FALLS ASSESS-DOCD LE1/YR: CPT | Mod: CPTII,S$GLB,, | Performed by: FAMILY MEDICINE

## 2019-03-07 PROCEDURE — 1101F PR PT FALLS ASSESS DOC 0-1 FALLS W/OUT INJ PAST YR: ICD-10-PCS | Mod: CPTII,S$GLB,, | Performed by: FAMILY MEDICINE

## 2019-03-07 PROCEDURE — 3074F PR MOST RECENT SYSTOLIC BLOOD PRESSURE < 130 MM HG: ICD-10-PCS | Mod: CPTII,S$GLB,, | Performed by: FAMILY MEDICINE

## 2019-03-07 PROCEDURE — 96372 PR INJECTION,THERAP/PROPH/DIAG2ST, IM OR SUBCUT: ICD-10-PCS | Mod: S$GLB,,, | Performed by: FAMILY MEDICINE

## 2019-03-07 PROCEDURE — 96372 THER/PROPH/DIAG INJ SC/IM: CPT | Mod: S$GLB,,, | Performed by: FAMILY MEDICINE

## 2019-03-07 PROCEDURE — 3074F SYST BP LT 130 MM HG: CPT | Mod: CPTII,S$GLB,, | Performed by: FAMILY MEDICINE

## 2019-03-07 PROCEDURE — 70220 X-RAY EXAM OF SINUSES: CPT | Mod: TC

## 2019-03-07 PROCEDURE — 70220 XR SINUSES MIN 3 VIEWS: ICD-10-PCS | Mod: 26,,, | Performed by: RADIOLOGY

## 2019-03-07 PROCEDURE — 99999 PR PBB SHADOW E&M-EST. PATIENT-LVL V: ICD-10-PCS | Mod: PBBFAC,,, | Performed by: FAMILY MEDICINE

## 2019-03-07 PROCEDURE — 3078F DIAST BP <80 MM HG: CPT | Mod: CPTII,S$GLB,, | Performed by: FAMILY MEDICINE

## 2019-03-07 PROCEDURE — 70220 X-RAY EXAM OF SINUSES: CPT | Mod: 26,,, | Performed by: RADIOLOGY

## 2019-03-07 PROCEDURE — 99213 PR OFFICE/OUTPT VISIT, EST, LEVL III, 20-29 MIN: ICD-10-PCS | Mod: 25,S$GLB,, | Performed by: FAMILY MEDICINE

## 2019-03-07 RX ORDER — BETAMETHASONE SODIUM PHOSPHATE AND BETAMETHASONE ACETATE 3; 3 MG/ML; MG/ML
6 INJECTION, SUSPENSION INTRA-ARTICULAR; INTRALESIONAL; INTRAMUSCULAR; SOFT TISSUE
Status: COMPLETED | OUTPATIENT
Start: 2019-03-07 | End: 2019-03-07

## 2019-03-07 RX ORDER — ASPIRIN 325 MG
325 TABLET ORAL
COMMUNITY

## 2019-03-07 RX ORDER — AMOXICILLIN AND CLAVULANATE POTASSIUM 500; 125 MG/1; MG/1
1 TABLET, FILM COATED ORAL 3 TIMES DAILY
Qty: 14 TABLET | Refills: 0 | Status: SHIPPED | OUTPATIENT
Start: 2019-03-07 | End: 2019-07-17

## 2019-03-07 RX ORDER — LINACLOTIDE 290 UG/1
CAPSULE, GELATIN COATED ORAL
Refills: 3 | COMMUNITY
Start: 2019-02-17 | End: 2021-10-04

## 2019-03-07 RX ORDER — LEVOTHYROXINE SODIUM 25 UG/1
25 TABLET ORAL
COMMUNITY

## 2019-03-07 RX ADMIN — BETAMETHASONE SODIUM PHOSPHATE AND BETAMETHASONE ACETATE 6 MG: 3; 3 INJECTION, SUSPENSION INTRA-ARTICULAR; INTRALESIONAL; INTRAMUSCULAR; SOFT TISSUE at 04:03

## 2019-03-07 NOTE — PROGRESS NOTES
Saskia Torres  03/07/2019  [id number]    Gurjit Hope MD  Patient Care Team:  Gurjit Hope MD as PCP - General (Family Medicine)  Imani Gonsales LPN as Care Coordinator (Internal Medicine)        Chief Complaint:  Chief Complaint   Patient presents with    Facial Swelling       History of Present Illness:  This work-in patient of Dr. Hope comes in today with complaint of right jaw and lip swelling for the past 3 or 4 days.  She states that it has happened a few times before and most recently occurred about 3-4 months ago and was treated by Dr. Hope and  the dentist's.  She thinks that it was primarily sinus and possibly gingiva related problem and says that antibiotics are necessary . She specifically mentioned  Augmentin was helpful.  She states that she has bad allergy problems  and keeps a fair amount of nasal and sinus  congestion pretty much year round.  She denies any injury to the mouth or jaw area.    Patient also states that she would like help with a problem that has been going on about 3 or 4 months concerning pain in the right forearm area.  She states she has not seen her physician for this or discussed it with him but that it is becoming more bothersome and is painful when she tries to pick something up in her right hand.  She is right-hand dominant.  Says that normally she has very good muscle strength.  Denies any injury to the right arm but says that she and her  are working on projects and there are large home.      History:  Past Medical History:   Diagnosis Date    Cataract     Hyperlipidemia     dr lopez    Hypertension     Hypothyroid     dr lopez    Insulin overdose 8/2014    Given 100U insulin at [facility] office () accidentally instead of estrogen depot    Interstitial cystitis      Past Surgical History:   Procedure Laterality Date    CATARACT EXTRACTION W/  INTRAOCULAR LENS IMPLANT Right OD 6/25/14    CATARACT EXTRACTION W/  INTRAOCULAR LENS IMPLANT  Left 3-18-15     SECTION      COLONOSCOPY N/A 2015    [facility]win [facility]    HYSTERECTOMY      bleeding    OOPHORECTOMY      one only     Family History   Problem Relation Age of Onset    Hypertension Father     Hyperlipidemia Father      Social History     Socioeconomic History    Marital status:      Spouse name: Not on file    Number of children: 3    Years of education: Not on file    Highest education level: Not on file   Social Needs    Financial resource strain: Not on file    Food insecurity - worry: Not on file    Food insecurity - inability: Not on file    Transportation needs - medical: Not on file    Transportation needs - non-medical: Not on file   Occupational History    Not on file   Tobacco Use    Smoking status: Former Smoker    Smokeless tobacco: Never Used   Substance and Sexual Activity    Alcohol use: Yes     Comment: socially     Drug use: No    Sexual activity: Not on file   Other Topics Concern    Not on file   Social History Narrative    Not on file     Patient Active Problem List   Diagnosis    Decreased libido    Mixed hearing loss, unspecified    Sensorineural hearing loss, bilateral    Unspecified fall    Allergic conjunctivitis - Both Eyes    Refraction error - Both Eyes    Blepharitis, unspecified - Both Eyes    Refractive error - Both Eyes    Lens replaced by other means    Hypothyroid    Hyperlipidemia    Hypertension    Interstitial cystitis    Rectal bleeding    Syncope and collapse    Family history of brain aneurysm     Review of patient's allergies indicates:   Allergen Reactions    No known drug allergies        The following were reviewed at this visit: active problem list, medication list, allergies, family history, social history, and health maintenance.    Medications:  Current Outpatient Medications on File Prior to Visit   Medication Sig Dispense Refill    AMITIZA 24 mcg Cap TK ONE  C PO  BID  0    aspirin 325 MG tablet Take 325 mg by mouth.      ezetimibe-simvastatin 10-20 mg (VYTORIN) 10-20 mg per tablet Take 1 tablet by mouth every other day.      fluticasone (FLONASE) 50 mcg/actuation nasal spray 1 spray by Each Nare route as needed for Rhinitis.      furosemide (LASIX) 20 MG tablet Take 20 mg by mouth as needed.   0    levothyroxine (SYNTHROID) 25 MCG tablet Take 25 mcg by mouth.      LINZESS 290 mcg Cap TAKE 1 CAPSULES BY MOUTH DAILY  3    liothyronine (CYTOMEL) 5 MCG Tab Take 1 tablet(s) every day by oral route in the morning.  0    lisinopril (PRINIVIL,ZESTRIL) 40 MG tablet TK 1 T PO  QD  2    olopatadine (PATADAY) 0.2 % Drop Place 1 drop into both eyes daily as needed. 1 Bottle 6    potassium chloride SA (K-DUR,KLOR-CON) 20 MEQ tablet Take 20 mEq by mouth once daily.  0    traZODone (DESYREL) 50 MG tablet trazodone 50 mg tablet   take 1 tablet by mouth at bedtime      vortioxetine (TRINTELLIX) 20 mg Tab Trintellix 20 mg tablet       No current facility-administered medications on file prior to visit.        Medications have been reviewed and reconciled with patient at this visit.      Exam:  Wt Readings from Last 3 Encounters:   03/07/19 58.6 kg (129 lb 3 oz)   12/18/18 59.5 kg (131 lb 1 oz)   11/05/18 59.9 kg (132 lb 0.9 oz)     Temp Readings from Last 3 Encounters:   03/07/19 97.7 °F (36.5 °C) (Tympanic)   12/18/18 98.5 °F (36.9 °C) (Tympanic)   11/05/18 98.6 °F (37 °C) (Tympanic)     BP Readings from Last 3 Encounters:   03/07/19 110/66   12/18/18 100/66   11/05/18 108/70     Pulse Readings from Last 3 Encounters:   03/07/19 66   12/18/18 99   11/05/18 89     Body mass index is 22.18 kg/m².      Review of Systems   Constitutional: Negative for chills, fever and weight loss.   HENT: Positive for congestion and sinus pain. Negative for ear pain.    Respiratory: Negative for shortness of breath.    Cardiovascular: Negative for chest pain.     Physical Exam-thin but  well-nourished well-developed alert and oriented very pleasant adult female in no acute distress, ambulatory.  HEENT-ear canals and TMs intact  Pharynx benign  There is visible evidence diffuse swelling from the right corner of the patient's mouth and lips to the right maxillary ridge.  No tenderness to palpation.  The patient puts her finger along the upper gingiva on the right but I cannot visibly see any defect or swelling inside the mouth and the teeth and gingiva or nontender to palpation.  Patient has mild tenderness to palpation over the right maxillary sinus region.  Neck is supple without palpable nodes  Heart regular rate and rhythm without murmur  Lungs clear to auscultation  Abdomen soft nontender  Neuro grossly intact  Psychiatric good affect and cognition    Right forearm-definite tenderness to palpation at the right lateral epicondyle and insertion of the extensor tendons.  The patient has a good strong  but it does cause increased pain in the right lateral epicondyle region when she  or dorsiflexes  the wrist.  Hand and wrist motion is normal.        Saskia was seen today for facial swelling.    Diagnoses and all orders for this visit:    Sinusitis, unspecified chronicity, unspecified location  -     X-Ray Sinuses 3 or more views; Future    Essential hypertension    Other orders  -     betamethasone acetate-betamethasone sodium phosphate injection 6 mg  -     amoxicillin-clavulanate 500-125mg (AUGMENTIN) 500-125 mg Tab; Take 1 tablet (500 mg total) by mouth 3 (three) times daily.                  Follow up: Follow-up if symptoms worsen or fail to improve.    After visit summary was printed and given to patient upon discharge today.  Patient goals and care plan are included in After Visit Summary.

## 2019-03-07 NOTE — PATIENT INSTRUCTIONS
Swish benadryl in mouth 3x/ day  Ice to lip prn        Ice to right elbow 3x/day prn    Tennis elbow velcro strap ( [employer] or drug store)    Return for elbow injection if needed ( after discuss with Dr Hope)

## 2019-03-17 DIAGNOSIS — H10.13 ALLERGIC CONJUNCTIVITIS OF BOTH EYES: ICD-10-CM

## 2019-03-19 RX ORDER — OLOPATADINE HYDROCHLORIDE 2 MG/ML
SOLUTION/ DROPS OPHTHALMIC
Qty: 2.5 ML | Refills: 6 | Status: SHIPPED | OUTPATIENT
Start: 2019-03-19 | End: 2019-11-13 | Stop reason: SDUPTHER

## 2019-05-23 ENCOUNTER — TELEPHONE (OUTPATIENT)
Dept: INTERNAL MEDICINE | Facility: CLINIC | Age: 74
End: 2019-05-23

## 2019-05-23 NOTE — TELEPHONE ENCOUNTER
Patient was contacted to schedule her Overdue Mammogram; patient did not answer the phone so a messge was left for patient to contact the office to get her mammogram scheduled.PDalashay

## 2019-07-16 ENCOUNTER — TELEPHONE (OUTPATIENT)
Dept: INTERNAL MEDICINE | Facility: CLINIC | Age: 74
End: 2019-07-16

## 2019-07-16 DIAGNOSIS — R50.9 FEVER, UNSPECIFIED FEVER CAUSE: Primary | ICD-10-CM

## 2019-07-16 NOTE — TELEPHONE ENCOUNTER
Received call from patient () with patient c/o diarrhea, headache, and dizziness that may be related to stay at [facility] in [place] that has had recent occurrences of Legionnaires Disease and has since been closed pending investigation. They stayed at the hotel June 26 to July 2 and she would like to be tested for Legionnaires.  After consulting with Dr Hope, the patient was advised to be seen in clinic for evaluation. She consented and is scheduled to be seen by Piedad Mustafa NP tomorrow at 7:40am at [facility] [place] location to which they expressed understanding.

## 2019-07-17 ENCOUNTER — OFFICE VISIT (OUTPATIENT)
Dept: INTERNAL MEDICINE | Facility: CLINIC | Age: 74
End: 2019-07-17
Payer: COMMERCIAL

## 2019-07-17 ENCOUNTER — HOSPITAL ENCOUNTER (OUTPATIENT)
Dept: RADIOLOGY | Facility: HOSPITAL | Age: 74
Discharge: HOME | End: 2019-07-17
Attending: NURSE PRACTITIONER
Payer: COMMERCIAL

## 2019-07-17 ENCOUNTER — LAB VISIT (OUTPATIENT)
Dept: LAB | Facility: HOSPITAL | Age: 74
End: 2019-07-17
Attending: NURSE PRACTITIONER
Payer: COMMERCIAL

## 2019-07-17 VITALS
OXYGEN SATURATION: 98 % | HEART RATE: 86 BPM | HEIGHT: 64 IN | SYSTOLIC BLOOD PRESSURE: 96 MMHG | TEMPERATURE: 98 F | BODY MASS INDEX: 20.03 KG/M2 | DIASTOLIC BLOOD PRESSURE: 60 MMHG | WEIGHT: 117.31 LBS

## 2019-07-17 DIAGNOSIS — R05.9 COUGH: ICD-10-CM

## 2019-07-17 DIAGNOSIS — R19.7 DIARRHEA, UNSPECIFIED TYPE: ICD-10-CM

## 2019-07-17 DIAGNOSIS — R42 DIZZINESS: ICD-10-CM

## 2019-07-17 DIAGNOSIS — R05.8 PRODUCTIVE COUGH: ICD-10-CM

## 2019-07-17 DIAGNOSIS — R19.7 DIARRHEA, UNSPECIFIED TYPE: Primary | ICD-10-CM

## 2019-07-17 LAB
ALBUMIN SERPL BCP-MCNC: 4 G/DL (ref 3.5–5.2)
ALP SERPL-CCNC: 51 U/L (ref 55–135)
ALT SERPL W/O P-5'-P-CCNC: 20 U/L (ref 10–44)
ANION GAP SERPL CALC-SCNC: 8 MMOL/L (ref 8–16)
AST SERPL-CCNC: 20 U/L (ref 10–40)
BASOPHILS # BLD AUTO: 0.04 K/UL (ref 0–0.2)
BASOPHILS NFR BLD: 0.6 % (ref 0–1.9)
BILIRUB SERPL-MCNC: 0.3 MG/DL (ref 0.1–1)
BUN SERPL-MCNC: 19 MG/DL (ref 8–23)
CALCIUM SERPL-MCNC: 9.6 MG/DL (ref 8.7–10.5)
CHLORIDE SERPL-SCNC: 105 MMOL/L (ref 95–110)
CO2 SERPL-SCNC: 26 MMOL/L (ref 23–29)
CREAT SERPL-MCNC: 0.9 MG/DL (ref 0.5–1.4)
DIFFERENTIAL METHOD: ABNORMAL
EOSINOPHIL # BLD AUTO: 0.1 K/UL (ref 0–0.5)
EOSINOPHIL NFR BLD: 1.7 % (ref 0–8)
ERYTHROCYTE [DISTWIDTH] IN BLOOD BY AUTOMATED COUNT: 12.7 % (ref 11.5–14.5)
EST. GFR  (AFRICAN AMERICAN): >60 ML/MIN/1.73 M^2
EST. GFR  (NON AFRICAN AMERICAN): >60 ML/MIN/1.73 M^2
GLUCOSE SERPL-MCNC: 93 MG/DL (ref 70–110)
HCT VFR BLD AUTO: 44.5 % (ref 37–48.5)
HGB BLD-MCNC: 14.2 G/DL (ref 12–16)
IMM GRANULOCYTES # BLD AUTO: 0.03 K/UL (ref 0–0.04)
IMM GRANULOCYTES NFR BLD AUTO: 0.4 % (ref 0–0.5)
LYMPHOCYTES # BLD AUTO: 1.2 K/UL (ref 1–4.8)
LYMPHOCYTES NFR BLD: 16.1 % (ref 18–48)
MCH RBC QN AUTO: 28.2 PG (ref 27–31)
MCHC RBC AUTO-ENTMCNC: 31.9 G/DL (ref 32–36)
MCV RBC AUTO: 88 FL (ref 82–98)
MONOCYTES # BLD AUTO: 0.5 K/UL (ref 0.3–1)
MONOCYTES NFR BLD: 6.8 % (ref 4–15)
NEUTROPHILS # BLD AUTO: 5.4 K/UL (ref 1.8–7.7)
NEUTROPHILS NFR BLD: 74.8 % (ref 38–73)
NRBC BLD-RTO: 0 /100 WBC
PLATELET # BLD AUTO: 331 K/UL (ref 150–350)
PMV BLD AUTO: 8.4 FL (ref 9.2–12.9)
POTASSIUM SERPL-SCNC: 4.1 MMOL/L (ref 3.5–5.1)
PROT SERPL-MCNC: 7.2 G/DL (ref 6–8.4)
RBC # BLD AUTO: 5.04 M/UL (ref 4–5.4)
SODIUM SERPL-SCNC: 139 MMOL/L (ref 136–145)
WBC # BLD AUTO: 7.22 K/UL (ref 3.9–12.7)

## 2019-07-17 PROCEDURE — 99213 OFFICE O/P EST LOW 20 MIN: CPT | Mod: S$GLB,,, | Performed by: NURSE PRACTITIONER

## 2019-07-17 PROCEDURE — 99999 PR PBB SHADOW E&M-EST. PATIENT-LVL V: ICD-10-PCS | Mod: PBBFAC,,, | Performed by: NURSE PRACTITIONER

## 2019-07-17 PROCEDURE — 3074F SYST BP LT 130 MM HG: CPT | Mod: CPTII,S$GLB,, | Performed by: NURSE PRACTITIONER

## 2019-07-17 PROCEDURE — 36415 COLL VENOUS BLD VENIPUNCTURE: CPT

## 2019-07-17 PROCEDURE — 1101F PT FALLS ASSESS-DOCD LE1/YR: CPT | Mod: CPTII,S$GLB,, | Performed by: NURSE PRACTITIONER

## 2019-07-17 PROCEDURE — 71046 X-RAY EXAM CHEST 2 VIEWS: CPT | Mod: 26,,, | Performed by: RADIOLOGY

## 2019-07-17 PROCEDURE — 71046 X-RAY EXAM CHEST 2 VIEWS: CPT | Mod: TC

## 2019-07-17 PROCEDURE — 3074F PR MOST RECENT SYSTOLIC BLOOD PRESSURE < 130 MM HG: ICD-10-PCS | Mod: CPTII,S$GLB,, | Performed by: NURSE PRACTITIONER

## 2019-07-17 PROCEDURE — 99213 PR OFFICE/OUTPT VISIT, EST, LEVL III, 20-29 MIN: ICD-10-PCS | Mod: S$GLB,,, | Performed by: NURSE PRACTITIONER

## 2019-07-17 PROCEDURE — 3078F PR MOST RECENT DIASTOLIC BLOOD PRESSURE < 80 MM HG: ICD-10-PCS | Mod: CPTII,S$GLB,, | Performed by: NURSE PRACTITIONER

## 2019-07-17 PROCEDURE — 85025 COMPLETE CBC W/AUTO DIFF WBC: CPT

## 2019-07-17 PROCEDURE — 80053 COMPREHEN METABOLIC PANEL: CPT

## 2019-07-17 PROCEDURE — 3078F DIAST BP <80 MM HG: CPT | Mod: CPTII,S$GLB,, | Performed by: NURSE PRACTITIONER

## 2019-07-17 PROCEDURE — 99999 PR PBB SHADOW E&M-EST. PATIENT-LVL V: CPT | Mod: PBBFAC,,, | Performed by: NURSE PRACTITIONER

## 2019-07-17 PROCEDURE — 71046 XR CHEST PA AND LATERAL: ICD-10-PCS | Mod: 26,,, | Performed by: RADIOLOGY

## 2019-07-17 PROCEDURE — 1101F PR PT FALLS ASSESS DOC 0-1 FALLS W/OUT INJ PAST YR: ICD-10-PCS | Mod: CPTII,S$GLB,, | Performed by: NURSE PRACTITIONER

## 2019-07-17 RX ORDER — PHENTERMINE AND TOPIRAMATE 11.25; 69 MG/1; MG/1
1 CAPSULE, EXTENDED RELEASE ORAL DAILY
Refills: 2 | COMMUNITY
Start: 2019-06-10

## 2019-07-17 RX ORDER — ASCORBIC ACID 125 MG
TABLET,CHEWABLE ORAL
COMMUNITY

## 2019-07-17 NOTE — PROGRESS NOTES
Subjective:       Patient ID: Saskia Torres is a 73 y.o. female.    Chief Complaint: Diarrhea; Dizziness; and Headache    Patient presents with diarrhea, dizziness, sweats/hot, and shortness of breath that started about 3 days ago.  Reports possible exposure legionnaires while staying in [place].  The hotel that they were visiting is listed on the [facility] registry per .      Review of Systems   Constitutional: Positive for chills and fatigue.   Respiratory: Positive for cough and shortness of breath.    Cardiovascular: Negative for chest pain.   Gastrointestinal: Positive for diarrhea and nausea.   Genitourinary:        Discolored urine    Musculoskeletal: Negative for arthralgias and gait problem.   Psychiatric/Behavioral: Negative for agitation and confusion.       Objective:      Physical Exam   Constitutional: She is oriented to person, place, and time. Vital signs are normal. She appears well-developed and well-nourished.   HENT:   Head: Normocephalic and atraumatic.   Neck: Normal range of motion.   Cardiovascular: Normal rate and regular rhythm.   Pulmonary/Chest: Effort normal and breath sounds normal.   Abdominal: Soft. Bowel sounds are normal. There is tenderness in the suprapubic area.   Musculoskeletal: Normal range of motion.   Neurological: She is alert and oriented to person, place, and time.   Skin: Skin is warm.   Psychiatric: She has a normal mood and affect. Her behavior is normal.       Assessment:       1. Diarrhea, unspecified type    2. Dizziness    3. Productive cough    4. Cough        Plan:         Diarrhea, unspecified type  -     Urinalysis; Future; Expected date: 07/17/2019  -     CBC auto differential; Future; Expected date: 07/17/2019    Dizziness  -     CBC auto differential; Future; Expected date: 07/17/2019  -     Comprehensive metabolic panel; Future; Expected date: 07/17/2019    Productive cough  -     X-Ray Chest PA And Lateral; Future; Expected date:  07/17/2019    Cough  -     X-Ray Chest PA And Lateral; Future; Expected date: 07/17/2019        Labs and CXR today.  Encourage rest and hydration.  Clear liquid/bland diet. Reports another provider called in a prescription for an antibiotic to treat the legionnaires.  Will  today.  Will inform of lab reports when available.

## 2019-11-12 ENCOUNTER — TELEPHONE (OUTPATIENT)
Dept: ALLERGY | Facility: CLINIC | Age: 74
End: 2019-11-12

## 2019-11-12 NOTE — TELEPHONE ENCOUNTER
Left message to pt reminding pt of appt in 11/13 at 8am. Advised pt to call back if they have any questions.

## 2019-11-13 ENCOUNTER — LAB VISIT (OUTPATIENT)
Dept: LAB | Facility: HOSPITAL | Age: 74
End: 2019-11-13
Attending: ALLERGY & IMMUNOLOGY
Payer: COMMERCIAL

## 2019-11-13 ENCOUNTER — OFFICE VISIT (OUTPATIENT)
Dept: ALLERGY | Facility: CLINIC | Age: 74
End: 2019-11-13
Payer: COMMERCIAL

## 2019-11-13 VITALS
SYSTOLIC BLOOD PRESSURE: 109 MMHG | WEIGHT: 129.88 LBS | HEART RATE: 70 BPM | HEIGHT: 67 IN | TEMPERATURE: 96 F | BODY MASS INDEX: 20.38 KG/M2 | DIASTOLIC BLOOD PRESSURE: 61 MMHG

## 2019-11-13 DIAGNOSIS — J31.0 RHINITIS, UNSPECIFIED TYPE: ICD-10-CM

## 2019-11-13 DIAGNOSIS — K90.49 FOOD INTOLERANCE: ICD-10-CM

## 2019-11-13 DIAGNOSIS — R22.0 FACIAL SWELLING: Primary | ICD-10-CM

## 2019-11-13 DIAGNOSIS — H10.13 ALLERGIC CONJUNCTIVITIS OF BOTH EYES: ICD-10-CM

## 2019-11-13 DIAGNOSIS — R22.0 FACIAL SWELLING: ICD-10-CM

## 2019-11-13 LAB — IGE SERPL-ACNC: <35 IU/ML (ref 0–100)

## 2019-11-13 PROCEDURE — 99999 PR PBB SHADOW E&M-EST. PATIENT-LVL IV: CPT | Mod: PBBFAC,,, | Performed by: ALLERGY & IMMUNOLOGY

## 2019-11-13 PROCEDURE — 3078F DIAST BP <80 MM HG: CPT | Mod: CPTII,S$GLB,, | Performed by: ALLERGY & IMMUNOLOGY

## 2019-11-13 PROCEDURE — 3074F SYST BP LT 130 MM HG: CPT | Mod: CPTII,S$GLB,, | Performed by: ALLERGY & IMMUNOLOGY

## 2019-11-13 PROCEDURE — 95004 PERQ TESTS W/ALRGNC XTRCS: CPT | Mod: S$GLB,,, | Performed by: ALLERGY & IMMUNOLOGY

## 2019-11-13 PROCEDURE — 86003 ALLG SPEC IGE CRUDE XTRC EA: CPT

## 2019-11-13 PROCEDURE — 3074F PR MOST RECENT SYSTOLIC BLOOD PRESSURE < 130 MM HG: ICD-10-PCS | Mod: CPTII,S$GLB,, | Performed by: ALLERGY & IMMUNOLOGY

## 2019-11-13 PROCEDURE — 99203 OFFICE O/P NEW LOW 30 MIN: CPT | Mod: 25,S$GLB,, | Performed by: ALLERGY & IMMUNOLOGY

## 2019-11-13 PROCEDURE — 1101F PR PT FALLS ASSESS DOC 0-1 FALLS W/OUT INJ PAST YR: ICD-10-PCS | Mod: CPTII,S$GLB,, | Performed by: ALLERGY & IMMUNOLOGY

## 2019-11-13 PROCEDURE — 82785 ASSAY OF IGE: CPT

## 2019-11-13 PROCEDURE — 3078F PR MOST RECENT DIASTOLIC BLOOD PRESSURE < 80 MM HG: ICD-10-PCS | Mod: CPTII,S$GLB,, | Performed by: ALLERGY & IMMUNOLOGY

## 2019-11-13 PROCEDURE — 86008 ALLG SPEC IGE RECOMB EA: CPT | Mod: 59

## 2019-11-13 PROCEDURE — 86003 ALLG SPEC IGE CRUDE XTRC EA: CPT | Mod: 59

## 2019-11-13 PROCEDURE — 36415 COLL VENOUS BLD VENIPUNCTURE: CPT

## 2019-11-13 PROCEDURE — 95004 PR ALLERGY SKIN TESTS,ALLERGENS: ICD-10-PCS | Mod: S$GLB,,, | Performed by: ALLERGY & IMMUNOLOGY

## 2019-11-13 PROCEDURE — 99999 PR PBB SHADOW E&M-EST. PATIENT-LVL IV: ICD-10-PCS | Mod: PBBFAC,,, | Performed by: ALLERGY & IMMUNOLOGY

## 2019-11-13 PROCEDURE — 99203 PR OFFICE/OUTPT VISIT, NEW, LEVL III, 30-44 MIN: ICD-10-PCS | Mod: 25,S$GLB,, | Performed by: ALLERGY & IMMUNOLOGY

## 2019-11-13 PROCEDURE — 1101F PT FALLS ASSESS-DOCD LE1/YR: CPT | Mod: CPTII,S$GLB,, | Performed by: ALLERGY & IMMUNOLOGY

## 2019-11-13 RX ORDER — OLOPATADINE HYDROCHLORIDE 2 MG/ML
SOLUTION/ DROPS OPHTHALMIC
Qty: 2.5 ML | Refills: 6 | Status: SHIPPED | OUTPATIENT
Start: 2019-11-13 | End: 2020-11-18

## 2019-11-13 NOTE — PATIENT INSTRUCTIONS
Nasal saline drops    Stop Lisinopril and medications in this class    Lab tests    CT scan    Continue Patanol and Claritin

## 2019-11-13 NOTE — PROGRESS NOTES
"Subjective:       Patient ID: Saskia Torres is a 74 y.o. female.    Chief Complaint:  Allergies      HPI:  74 year old female complaining of right sided facial swelling occurring intermittently for several years. Her lip intermittently swells. She reports abdominal pain associated with milk. She states that the smell of milk or butter causes her to "fell sick".She tolerates milk and milk products in [place] without symptoms. She states that the milk symptoms and facial swelling are separate complaints. She reports waking up with the swelling. She states that it lasts 3-4 days. She takes benadryl, which helps to alleviate symptoms. She denies tongue or throat swelling. She denies fever or chills. She reports a runny nose apart from colds. She has intermittent episodes of epistaxis. She reports intermittent itchy eyes. She takes Patanol eye drops, which helps to alleviate symptoms. She reports having allergy testing several years ago. She is unsure of her specific allergies. She denies a sinus infection in the last 12 months. She has not had sinus surgery.   She denies a history of tongue or throat swelling. She denies a history of anaphylaxis.      Past Medical History:   Diagnosis Date    Cataract     Hyperlipidemia     dr lopez    Hypertension     Hypothyroid     dr lopez    Insulin overdose 8/2014    Given 100U insulin at [facility] office () accidentally instead of estrogen depot    Interstitial cystitis      Family History   Problem Relation Age of Onset    Hypertension Father     Hyperlipidemia Father      Review of patient's allergies indicates:   Allergen Reactions    No known drug allergies    Milk- see HPI  No insect sting allergy        Environmental History: No pets. Stopped smoking greater than 30 years ago.   Review of Systems   Constitutional: Negative for chills and fever.   HENT: Positive for facial swelling, nosebleeds and rhinorrhea.    Eyes: Positive for discharge and itching. "   Respiratory: Negative for choking, shortness of breath and wheezing.    Cardiovascular: Negative for chest pain and palpitations.   Gastrointestinal: Negative for constipation, diarrhea, nausea and vomiting.   Endocrine: Negative for polyuria.   Genitourinary: Negative for dysuria and frequency.   Musculoskeletal: Negative for arthralgias and back pain.   Skin: Negative for rash.   Allergic/Immunologic: Positive for environmental allergies and food allergies.   Neurological: Negative for dizziness and numbness.   Hematological: Negative for adenopathy. Does not bruise/bleed easily.   Psychiatric/Behavioral: Negative for agitation and confusion.        Objective:    Physical Exam   Constitutional: She is oriented to person, place, and time. She appears well-developed and well-nourished. No distress.   HENT:   Head: Normocephalic and atraumatic.   Right Ear: External ear normal.   Left Ear: External ear normal.   Nose: Nose normal.   Mouth/Throat: Oropharynx is clear and moist. No oropharyngeal exudate.   Eyes: Pupils are equal, round, and reactive to light. Right eye exhibits no discharge. Left eye exhibits no discharge. No scleral icterus.   Neck: Normal range of motion. Neck supple. No thyromegaly present.   Cardiovascular: Normal rate, regular rhythm and normal heart sounds.   No murmur heard.  Pulmonary/Chest: Effort normal and breath sounds normal. No stridor. No respiratory distress. She has no wheezes. She has no rales.   Abdominal: Soft. Bowel sounds are normal. She exhibits no distension. There is no tenderness. There is no guarding.   Musculoskeletal: Normal range of motion. She exhibits no edema.   Lymphadenopathy:     She has no cervical adenopathy.   Neurological: She is alert and oriented to person, place, and time.   Skin: Skin is warm. She is not diaphoretic. No erythema. No pallor.   Psychiatric: She has a normal mood and affect. Judgment and thought content normal.   Vitals  reviewed.      Laboratory:   in approprioate response to the positive control    The following were placed: Cat, Dog, Dust mite(F and P), Cockroach, Alternaria, Aspergillus, Helminthosporium, [place]  [place], Elm, [place], Ligustrum, Oak, Pecan, Red Cedar, [place], [place], [place], Dru, Red Top/Agrostis [place], Rye/Lolium, Juan Manuel, English Plantain, Marsh Elder, Pigweed, Ragweed, Russian Thistle, Curvularia/Drechsiera Spicifera, Epicoccum, Fusarium, Hormodendrum/Cladosporium, Penicillium, Monilia/candida, Phoma, Stemphylium  Histamine 2mm by 2mm  Saline 2mm by 2 mm  Assessment:       1. Facial swelling    2. Rhinitis, unspecified type    3. Food intolerance    4. Allergic conjunctivitis of both eyes         Plan:       Facial swelling  -     CT Sinuses without Contrast; Future; Expected date: 11/13/2019  -     Protein electrophoresis, serum; Future; Expected date: 11/13/2019    Rhinitis, unspecified type  -     IgE; Future; Expected date: 11/13/2019  -     [place] grass IgE; Future; Expected date: 11/13/2019  -     Aspergillus fumagatus IgE; Future; Expected date: 11/13/2019  -     Chaetomium globosum IgE; Future; Expected date: 11/13/2019  -     Cockroach, American IgE; Future; Expected date: 11/13/2019  -     Cladosporium IgE; Future; Expected date: 11/13/2019  -     Curvularia lunata IgE; Future; Expected date: 11/13/2019  -     D. farinae IgE; Future; Expected date: 11/13/2019  -     D. pteronyssinus IgE; Future; Expected date: 11/13/2019  -     Dog dander IgE; Future; Expected date: 11/13/2019  -     Plantain, English IgE; Future; Expected date: 11/13/2019  -     Eucalyptus IgE; Future; Expected date: 11/13/2019  -     Marsh elder, rough IgE; Future; Expected date: 11/13/2019  -     Mugwort IgE; Future; Expected date: 11/13/2019  -     Nettle IgE; Future; Expected date: 11/13/2019  -     Orchard grass IgE; Future; Expected date: 11/13/2019  -     [place], western white IgE; Future; Expected date:  11/13/2019  -     Ragweed, short, common IgE; Future; Expected date: 11/13/2019  -     Red top grass IgE; Future; Expected date: 11/13/2019  -     Rye grass, cultivated IgE; Future; Expected date: 11/13/2019  -     Thistle, Russian IgE; Future; Expected date: 11/13/2019  -     Stemphyllium IgE; Future; Expected date: 11/13/2019  -     Juan Manuel IgE; Future; Expected date: 11/13/2019  -     Dru grass IgE; Future; Expected date: 11/13/2019  -     ALLERGEN CAT EPITHELLIUM; Future; Expected date: 11/13/2019    Food intolerance  -     Allergen, Milk Components IGE; Future; Expected date: 11/13/2019    Allergic conjunctivitis of both eyes  -     olopatadine (PATADAY) 0.2 % Drop; INSTILL 1 DROP IN BOTH EYES DAILY AS NEEDED  Dispense: 2.5 mL; Refill: 6    Suspect a food intolerance and not a food allergy to milk. She is able to tolerate milk and cheese (made from a cow) from certain parts of the world. Milk component testing ordered at the patient's request. Avoid milk and milk products know to cause symptoms.  Continue oral antihistamines- Claritin  Nasal saline wetting drops will alleviate dry nasal symptoms.  RTC 4 weeks or sooner, if needed.

## 2019-11-15 ENCOUNTER — PATIENT MESSAGE (OUTPATIENT)
Dept: ALLERGY | Facility: CLINIC | Age: 74
End: 2019-11-15

## 2019-11-15 ENCOUNTER — TELEPHONE (OUTPATIENT)
Dept: ALLERGY | Facility: CLINIC | Age: 74
End: 2019-11-15

## 2019-11-15 ENCOUNTER — HOSPITAL ENCOUNTER (OUTPATIENT)
Dept: RADIOLOGY | Facility: HOSPITAL | Age: 74
Discharge: HOME | End: 2019-11-15
Attending: ALLERGY & IMMUNOLOGY
Payer: COMMERCIAL

## 2019-11-15 DIAGNOSIS — R22.0 FACIAL SWELLING: ICD-10-CM

## 2019-11-15 LAB
A FUMIGATUS IGE QN: <0.1 KU/L
ALLERGEN CHAETOMIUM GLOBOSUM IGE: <0.1 KU/L
ALLERGEN WHITE PINE TREE IGE: <0.1 KU/L
BAHIA GRASS IGE QN: <0.1 KU/L
C HERBARUM IGE QN: <0.1 KU/L
C LUNATA IGE QN: <0.1 KU/L
CAT DANDER IGE QN: <0.1 KU/L
CHAETOMIUM GLOB. CLASS: NORMAL
COCKSFOOT IGE QN: <0.1 KU/L
COMMON RAGWEED IGE QN: <0.1 KU/L
D FARINAE IGE QN: <0.1 KU/L
D PTERONYSS IGE QN: <0.1 KU/L
DEPRECATED A FUMIGATUS IGE RAST QL: NORMAL
DEPRECATED BAHIA GRASS IGE RAST QL: NORMAL
DEPRECATED C HERBARUM IGE RAST QL: NORMAL
DEPRECATED C LUNATA IGE RAST QL: NORMAL
DEPRECATED CAT DANDER IGE RAST QL: NORMAL
DEPRECATED COCKSFOOT IGE RAST QL: NORMAL
DEPRECATED COMMON RAGWEED IGE RAST QL: NORMAL
DEPRECATED D FARINAE IGE RAST QL: NORMAL
DEPRECATED D PTERONYSS IGE RAST QL: NORMAL
DEPRECATED DOG DANDER IGE RAST QL: NORMAL
DEPRECATED ELDER IGE RAST QL: NORMAL
DEPRECATED ENGL PLANTAIN IGE RAST QL: NORMAL
DEPRECATED JOHNSON GRASS IGE RAST QL: NORMAL
DEPRECATED MUGWORT IGE RAST QL: NORMAL
DEPRECATED NETTLE IGE RAST QL: NORMAL
DEPRECATED PER RYE GRASS IGE RAST QL: NORMAL
DEPRECATED RED TOP GRASS IGE RAST QL: NORMAL
DEPRECATED ROACH IGE RAST QL: NORMAL
DEPRECATED SALTWORT IGE RAST QL: NORMAL
DEPRECATED TIMOTHY IGE RAST QL: NORMAL
DOG DANDER IGE QN: <0.1 KU/L
ELDER IGE QN: <0.1 KU/L
ENGL PLANTAIN IGE QN: <0.1 KU/L
JOHNSON GRASS IGE QN: <0.1 KU/L
MUGWORT IGE QN: <0.1 KU/L
NETTLE IGE QN: <0.1 KU/L
PER RYE GRASS IGE QN: <0.1 KU/L
RED TOP GRASS IGE QN: <0.1 KU/L
ROACH IGE QN: <0.1 KU/L
SALTWORT IGE QN: <0.1 KU/L
TIMOTHY IGE QN: <0.1 KU/L
WHITE PINE CLASS: NORMAL

## 2019-11-15 PROCEDURE — 70486 CT MAXILLOFACIAL W/O DYE: CPT | Mod: 26,,, | Performed by: RADIOLOGY

## 2019-11-15 PROCEDURE — 70486 CT MAXILLOFACIAL W/O DYE: CPT | Mod: TC

## 2019-11-15 PROCEDURE — 70486 CT SINUSES WITHOUT CONTRAST: ICD-10-PCS | Mod: 26,,, | Performed by: RADIOLOGY

## 2019-11-15 NOTE — TELEPHONE ENCOUNTER
"Called. No response. Unable to leave a message.  Called to discuss Ct scan results- "no significant sinus disease."  "

## 2019-11-16 LAB
A-LACTALB IGE QN: <0.1 KU/L
B-LACTOGLOB IGE QN: <0.1 KU/L
CASEIN IGE QN: <0.1 KU/L
COW MILK IGE QN: <0.1 KU/L
DEPRECATED MISC ALLERGEN IGE RAST QL: NORMAL

## 2019-11-20 ENCOUNTER — TELEPHONE (OUTPATIENT)
Dept: ALLERGY | Facility: CLINIC | Age: 74
End: 2019-11-20

## 2019-11-20 LAB
DEPRECATED GUM-TREE IGE RAST QL: NORMAL
GUM-TREE IGE QN: <0.1 KU/L
STEMPHYLIUM HERBARUM CLASS: NORMAL
STEMPHYLLIUM, IGE: <0.1 KU/L

## 2019-11-20 NOTE — TELEPHONE ENCOUNTER
Patient advised of CT being negative and that we are waiting on one lab result but the other labs returned negative. I told patient that  will let her know result when she receives it.Patient verbalizes understanding.----- Message from Humaira Murillo MD sent at 11/20/2019  3:23 PM CST -----  Please advise that I tried calling last week, but there was no response. It also appears that the [employer] message has not been read. Her Ct scan was negative. Labs so far are negative. One lab is pending and I will be in touch when it comes back.  Happy Thanksgiving.

## 2019-12-13 ENCOUNTER — PATIENT MESSAGE (OUTPATIENT)
Dept: ALLERGY | Facility: CLINIC | Age: 74
End: 2019-12-13

## 2019-12-13 ENCOUNTER — TELEPHONE (OUTPATIENT)
Dept: ALLERGY | Facility: CLINIC | Age: 74
End: 2019-12-13

## 2019-12-13 NOTE — TELEPHONE ENCOUNTER
Patient sent a message through THE [place] and voice mail left on phone to call and scheduled lab. ----- Message from Humaira Murillo MD sent at 12/13/2019  8:01 AM CST -----  Can you please have this redrawn?  thanks  ----- Message -----  From: SYSTEM  Sent: 12/13/2019  12:23 AM CST  To: Humaira Murillo MD

## 2019-12-17 ENCOUNTER — TELEPHONE (OUTPATIENT)
Dept: ALLERGY | Facility: CLINIC | Age: 74
End: 2019-12-17

## 2019-12-17 NOTE — TELEPHONE ENCOUNTER
----- Message from Humaira Murillo MD sent at 12/13/2019  8:01 AM CST -----  Can you please have this redrawn?  thanks  ----- Message -----  From: SYSTEM  Sent: 12/13/2019  12:23 AM CST  To: Humaira Murillo MD

## 2020-01-14 ENCOUNTER — TELEPHONE (OUTPATIENT)
Dept: ALLERGY | Facility: CLINIC | Age: 75
End: 2020-01-14

## 2020-01-14 DIAGNOSIS — R22.0 FACIAL SWELLING: Primary | ICD-10-CM

## 2020-01-14 NOTE — TELEPHONE ENCOUNTER
----- Message from Humaira Murillo MD sent at 1/14/2020  8:14 AM CST -----  Hi,    Can we send her a letter, since it looks like she did not get th phone message or portal message.    Thanks,  JSR  
Letter printed and placed in the mail to address on file.  
WDL

## 2020-07-08 ENCOUNTER — PATIENT OUTREACH (OUTPATIENT)
Dept: ADMINISTRATIVE | Facility: HOSPITAL | Age: 75
End: 2020-07-08

## 2020-07-31 DIAGNOSIS — Z12.39 BREAST CANCER SCREENING: ICD-10-CM

## 2020-08-17 ENCOUNTER — PATIENT OUTREACH (OUTPATIENT)
Dept: ADMINISTRATIVE | Facility: HOSPITAL | Age: 75
End: 2020-08-17

## 2020-08-17 NOTE — PROGRESS NOTES
[employer] QC report.  Attempted to call patient to schedule overdue annual with PCP. Last seen in 2018. Also due for mammogram.  No answer. Unable to leave message.

## 2020-10-06 ENCOUNTER — PATIENT MESSAGE (OUTPATIENT)
Dept: ADMINISTRATIVE | Facility: HOSPITAL | Age: 75
End: 2020-10-06

## 2020-12-15 ENCOUNTER — PATIENT OUTREACH (OUTPATIENT)
Dept: ADMINISTRATIVE | Facility: OTHER | Age: 75
End: 2020-12-15

## 2020-12-15 NOTE — PROGRESS NOTES
Health Maintenance Due   Topic Date Due    TETANUS VACCINE  11[birth year]09/1965    DEXA SCAN  11/09/1987    Shingles Vaccine (2 of 3) 10/21/2015    Mammogram  11/15/2017    Influenza Vaccine (1) 08/01/2020     Updates were requested from care everywhere.  Chart was reviewed for overdue Proactive [facility] Encounters ([facility]UNTER) topics (CRS, Breast Cancer Screening, Eye exam)  Health Maintenance has been updated.  LINKS immunization registry triggered.  LINKS not responding.

## 2020-12-16 ENCOUNTER — OFFICE VISIT (OUTPATIENT)
Dept: OPHTHALMOLOGY | Facility: CLINIC | Age: 75
End: 2020-12-16
Payer: COMMERCIAL

## 2020-12-16 DIAGNOSIS — Z96.1 PSEUDOPHAKIA OF BOTH EYES: Primary | ICD-10-CM

## 2020-12-16 DIAGNOSIS — H52.7 REFRACTIVE ERROR: ICD-10-CM

## 2020-12-16 PROCEDURE — 99999 PR PBB SHADOW E&M-EST. PATIENT-LVL III: ICD-10-PCS | Mod: PBBFAC,,, | Performed by: OPHTHALMOLOGY

## 2020-12-16 PROCEDURE — 92015 DETERMINE REFRACTIVE STATE: CPT | Mod: S$GLB,,, | Performed by: OPHTHALMOLOGY

## 2020-12-16 PROCEDURE — 92014 PR EYE EXAM, EST PATIENT,COMPREHESV: ICD-10-PCS | Mod: S$GLB,,, | Performed by: OPHTHALMOLOGY

## 2020-12-16 PROCEDURE — 92015 PR REFRACTION: ICD-10-PCS | Mod: S$GLB,,, | Performed by: OPHTHALMOLOGY

## 2020-12-16 PROCEDURE — 99999 PR PBB SHADOW E&M-EST. PATIENT-LVL III: CPT | Mod: PBBFAC,,, | Performed by: OPHTHALMOLOGY

## 2020-12-16 PROCEDURE — 92014 COMPRE OPH EXAM EST PT 1/>: CPT | Mod: S$GLB,,, | Performed by: OPHTHALMOLOGY

## 2020-12-16 RX ORDER — AZELASTINE 1 MG/ML
SPRAY, METERED NASAL
COMMUNITY
Start: 2020-11-12 | End: 2021-04-20

## 2020-12-16 RX ORDER — MUPIROCIN 20 MG/G
OINTMENT TOPICAL
COMMUNITY
Start: 2020-11-17

## 2020-12-16 RX ORDER — FLUCONAZOLE 150 MG/1
TABLET ORAL
COMMUNITY
Start: 2020-12-12

## 2020-12-16 NOTE — PROGRESS NOTES
SUBJECTIVE  Saskia Torres is 75 y.o. female  Uncorrected distance visual acuity was 20/30 +1 in the right eye and 20/25 -2 in the left eye.   Chief Complaint   Patient presents with    Annual Exam          HPI     Pt here for annual exam. No pain or discomfort. Pt states she lost her   glasses, so she would like a new pair. No gtts.      1. PCIOL OS Toric 3/18/15  PCIOL OD Toric   2. H/O hayfever conjunctivitis    Pataday PRN  Famciclovir 500 mg BID    Last edited by Ephraim Vargas, Patient Care Assistant on 12/16/2020  1:13   PM. (History)         Assessment /Plan :  1. Pseudophakia of both eyes -stable    2. Refractive error -MR dispensed     RTC in 1 year or prn any changes

## 2021-01-08 ENCOUNTER — IMMUNIZATION (OUTPATIENT)
Dept: INTERNAL MEDICINE | Facility: CLINIC | Age: 76
End: 2021-01-08
Payer: COMMERCIAL

## 2021-01-08 DIAGNOSIS — Z23 NEED FOR VACCINATION: ICD-10-CM

## 2021-01-08 PROCEDURE — 91300 COVID-19, MRNA, LNP-S, PF, 30 MCG/0.3 ML DOSE VACCINE: CPT | Mod: PBBFAC | Performed by: FAMILY MEDICINE

## 2021-01-29 ENCOUNTER — IMMUNIZATION (OUTPATIENT)
Dept: INTERNAL MEDICINE | Facility: CLINIC | Age: 76
End: 2021-01-29
Payer: COMMERCIAL

## 2021-01-29 DIAGNOSIS — Z23 NEED FOR VACCINATION: Primary | ICD-10-CM

## 2021-01-29 PROCEDURE — 0002A COVID-19, MRNA, LNP-S, PF, 30 MCG/0.3 ML DOSE VACCINE: CPT | Mod: PBBFAC | Performed by: FAMILY MEDICINE

## 2021-01-29 PROCEDURE — 91300 COVID-19, MRNA, LNP-S, PF, 30 MCG/0.3 ML DOSE VACCINE: CPT | Mod: PBBFAC | Performed by: FAMILY MEDICINE

## 2021-03-10 ENCOUNTER — PATIENT OUTREACH (OUTPATIENT)
Dept: ADMINISTRATIVE | Facility: HOSPITAL | Age: 76
End: 2021-03-10

## 2021-03-11 ENCOUNTER — PATIENT OUTREACH (OUTPATIENT)
Dept: ADMINISTRATIVE | Facility: HOSPITAL | Age: 76
End: 2021-03-11

## 2021-03-24 ENCOUNTER — PATIENT OUTREACH (OUTPATIENT)
Dept: ADMINISTRATIVE | Facility: HOSPITAL | Age: 76
End: 2021-03-24

## 2021-04-20 ENCOUNTER — HOSPITAL ENCOUNTER (OUTPATIENT)
Dept: RADIOLOGY | Facility: HOSPITAL | Age: 76
Discharge: HOME | End: 2021-04-20
Attending: FAMILY MEDICINE
Payer: COMMERCIAL

## 2021-04-20 ENCOUNTER — OFFICE VISIT (OUTPATIENT)
Dept: INTERNAL MEDICINE | Facility: CLINIC | Age: 76
End: 2021-04-20
Payer: COMMERCIAL

## 2021-04-20 VITALS
SYSTOLIC BLOOD PRESSURE: 108 MMHG | HEART RATE: 65 BPM | TEMPERATURE: 97 F | WEIGHT: 127.44 LBS | OXYGEN SATURATION: 96 % | HEIGHT: 67 IN | DIASTOLIC BLOOD PRESSURE: 60 MMHG | BODY MASS INDEX: 20 KG/M2

## 2021-04-20 DIAGNOSIS — I10 ESSENTIAL HYPERTENSION: ICD-10-CM

## 2021-04-20 DIAGNOSIS — E03.9 HYPOTHYROIDISM, UNSPECIFIED TYPE: ICD-10-CM

## 2021-04-20 DIAGNOSIS — Z12.31 ENCOUNTER FOR SCREENING MAMMOGRAM FOR MALIGNANT NEOPLASM OF BREAST: ICD-10-CM

## 2021-04-20 DIAGNOSIS — Z00.00 ROUTINE HEALTH MAINTENANCE: Primary | ICD-10-CM

## 2021-04-20 DIAGNOSIS — Z78.0 ASYMPTOMATIC POSTMENOPAUSAL STATUS: ICD-10-CM

## 2021-04-20 PROCEDURE — 3008F PR BODY MASS INDEX (BMI) DOCUMENTED: ICD-10-PCS | Mod: CPTII,S$GLB,, | Performed by: FAMILY MEDICINE

## 2021-04-20 PROCEDURE — 99999 PR PBB SHADOW E&M-EST. PATIENT-LVL V: CPT | Mod: PBBFAC,,, | Performed by: FAMILY MEDICINE

## 2021-04-20 PROCEDURE — 1126F PR PAIN SEVERITY QUANTIFIED, NO PAIN PRESENT: ICD-10-PCS | Mod: S$GLB,,, | Performed by: FAMILY MEDICINE

## 2021-04-20 PROCEDURE — 99397 PER PM REEVAL EST PAT 65+ YR: CPT | Mod: S$GLB,,, | Performed by: FAMILY MEDICINE

## 2021-04-20 PROCEDURE — 3288F FALL RISK ASSESSMENT DOCD: CPT | Mod: CPTII,S$GLB,, | Performed by: FAMILY MEDICINE

## 2021-04-20 PROCEDURE — 77067 SCR MAMMO BI INCL CAD: CPT | Mod: 26,,, | Performed by: RADIOLOGY

## 2021-04-20 PROCEDURE — 77063 BREAST TOMOSYNTHESIS BI: CPT | Mod: 26,,, | Performed by: RADIOLOGY

## 2021-04-20 PROCEDURE — 3288F PR FALLS RISK ASSESSMENT DOCUMENTED: ICD-10-PCS | Mod: CPTII,S$GLB,, | Performed by: FAMILY MEDICINE

## 2021-04-20 PROCEDURE — 77067 SCR MAMMO BI INCL CAD: CPT | Mod: TC

## 2021-04-20 PROCEDURE — 3008F BODY MASS INDEX DOCD: CPT | Mod: CPTII,S$GLB,, | Performed by: FAMILY MEDICINE

## 2021-04-20 PROCEDURE — 1101F PR PT FALLS ASSESS DOC 0-1 FALLS W/OUT INJ PAST YR: ICD-10-PCS | Mod: CPTII,S$GLB,, | Performed by: FAMILY MEDICINE

## 2021-04-20 PROCEDURE — 77063 MAMMO DIGITAL SCREENING BILAT WITH TOMO: ICD-10-PCS | Mod: 26,,, | Performed by: RADIOLOGY

## 2021-04-20 PROCEDURE — 1126F AMNT PAIN NOTED NONE PRSNT: CPT | Mod: S$GLB,,, | Performed by: FAMILY MEDICINE

## 2021-04-20 PROCEDURE — 99397 PR PREVENTIVE VISIT,EST,65 & OVER: ICD-10-PCS | Mod: S$GLB,,, | Performed by: FAMILY MEDICINE

## 2021-04-20 PROCEDURE — 99999 PR PBB SHADOW E&M-EST. PATIENT-LVL V: ICD-10-PCS | Mod: PBBFAC,,, | Performed by: FAMILY MEDICINE

## 2021-04-20 PROCEDURE — 1101F PT FALLS ASSESS-DOCD LE1/YR: CPT | Mod: CPTII,S$GLB,, | Performed by: FAMILY MEDICINE

## 2021-04-20 PROCEDURE — 77067 MAMMO DIGITAL SCREENING BILAT WITH TOMO: ICD-10-PCS | Mod: 26,,, | Performed by: RADIOLOGY

## 2021-04-20 RX ORDER — MINERAL OIL
180 ENEMA (ML) RECTAL DAILY
Qty: 30 TABLET | Refills: 11 | Status: SHIPPED | OUTPATIENT
Start: 2021-04-20

## 2021-06-22 ENCOUNTER — TELEPHONE (OUTPATIENT)
Dept: ALLERGY | Facility: CLINIC | Age: 76
End: 2021-06-22

## 2021-06-23 ENCOUNTER — TELEPHONE (OUTPATIENT)
Dept: ALLERGY | Facility: CLINIC | Age: 76
End: 2021-06-23

## 2021-06-28 ENCOUNTER — PATIENT OUTREACH (OUTPATIENT)
Dept: ADMINISTRATIVE | Facility: OTHER | Age: 76
End: 2021-06-28

## 2021-06-28 ENCOUNTER — LAB VISIT (OUTPATIENT)
Dept: LAB | Facility: HOSPITAL | Age: 76
End: 2021-06-28
Attending: ALLERGY & IMMUNOLOGY
Payer: COMMERCIAL

## 2021-06-28 ENCOUNTER — OFFICE VISIT (OUTPATIENT)
Dept: ALLERGY | Facility: CLINIC | Age: 76
End: 2021-06-28
Payer: COMMERCIAL

## 2021-06-28 VITALS
TEMPERATURE: 98 F | WEIGHT: 127.19 LBS | SYSTOLIC BLOOD PRESSURE: 109 MMHG | BODY MASS INDEX: 21.71 KG/M2 | DIASTOLIC BLOOD PRESSURE: 59 MMHG | HEIGHT: 64 IN | HEART RATE: 63 BPM

## 2021-06-28 DIAGNOSIS — Z91.018 FOOD ALLERGY: ICD-10-CM

## 2021-06-28 DIAGNOSIS — R22.0 LIP SWELLING: ICD-10-CM

## 2021-06-28 DIAGNOSIS — R22.0 FACIAL SWELLING: Primary | ICD-10-CM

## 2021-06-28 DIAGNOSIS — R22.0 FACIAL SWELLING: ICD-10-CM

## 2021-06-28 LAB
ALBUMIN SERPL BCP-MCNC: 4.1 G/DL (ref 3.5–5.2)
ALP SERPL-CCNC: 47 U/L (ref 55–135)
ALT SERPL W/O P-5'-P-CCNC: 23 U/L (ref 10–44)
ANION GAP SERPL CALC-SCNC: 11 MMOL/L (ref 8–16)
AST SERPL-CCNC: 26 U/L (ref 10–40)
BASOPHILS # BLD AUTO: 0.04 K/UL (ref 0–0.2)
BASOPHILS NFR BLD: 0.7 % (ref 0–1.9)
BILIRUB SERPL-MCNC: 0.4 MG/DL (ref 0.1–1)
BUN SERPL-MCNC: 14 MG/DL (ref 8–23)
CALCIUM SERPL-MCNC: 9.6 MG/DL (ref 8.7–10.5)
CHLORIDE SERPL-SCNC: 108 MMOL/L (ref 95–110)
CO2 SERPL-SCNC: 23 MMOL/L (ref 23–29)
CREAT SERPL-MCNC: 1 MG/DL (ref 0.5–1.4)
DIFFERENTIAL METHOD: ABNORMAL
EOSINOPHIL # BLD AUTO: 0.1 K/UL (ref 0–0.5)
EOSINOPHIL NFR BLD: 1.3 % (ref 0–8)
ERYTHROCYTE [DISTWIDTH] IN BLOOD BY AUTOMATED COUNT: 13 % (ref 11.5–14.5)
EST. GFR  (AFRICAN AMERICAN): >60 ML/MIN/1.73 M^2
EST. GFR  (NON AFRICAN AMERICAN): 56 ML/MIN/1.73 M^2
GLUCOSE SERPL-MCNC: 80 MG/DL (ref 70–110)
HCT VFR BLD AUTO: 39.8 % (ref 37–48.5)
HGB BLD-MCNC: 12.5 G/DL (ref 12–16)
IMM GRANULOCYTES # BLD AUTO: 0.01 K/UL (ref 0–0.04)
IMM GRANULOCYTES NFR BLD AUTO: 0.2 % (ref 0–0.5)
LYMPHOCYTES # BLD AUTO: 1.1 K/UL (ref 1–4.8)
LYMPHOCYTES NFR BLD: 17.1 % (ref 18–48)
MCH RBC QN AUTO: 27.8 PG (ref 27–31)
MCHC RBC AUTO-ENTMCNC: 31.4 G/DL (ref 32–36)
MCV RBC AUTO: 88 FL (ref 82–98)
MONOCYTES # BLD AUTO: 0.4 K/UL (ref 0.3–1)
MONOCYTES NFR BLD: 5.9 % (ref 4–15)
NEUTROPHILS # BLD AUTO: 4.6 K/UL (ref 1.8–7.7)
NEUTROPHILS NFR BLD: 74.8 % (ref 38–73)
NRBC BLD-RTO: 0 /100 WBC
PLATELET # BLD AUTO: 317 K/UL (ref 150–450)
PMV BLD AUTO: 9.8 FL (ref 9.2–12.9)
POTASSIUM SERPL-SCNC: 3.5 MMOL/L (ref 3.5–5.1)
PROT SERPL-MCNC: 7.3 G/DL (ref 6–8.4)
RBC # BLD AUTO: 4.5 M/UL (ref 4–5.4)
SODIUM SERPL-SCNC: 142 MMOL/L (ref 136–145)
WBC # BLD AUTO: 6.13 K/UL (ref 3.9–12.7)

## 2021-06-28 PROCEDURE — 3008F PR BODY MASS INDEX (BMI) DOCUMENTED: ICD-10-PCS | Mod: CPTII,S$GLB,, | Performed by: ALLERGY & IMMUNOLOGY

## 2021-06-28 PROCEDURE — 84165 PROTEIN E-PHORESIS SERUM: CPT | Mod: 26,,, | Performed by: PATHOLOGY

## 2021-06-28 PROCEDURE — 3288F FALL RISK ASSESSMENT DOCD: CPT | Mod: CPTII,S$GLB,, | Performed by: ALLERGY & IMMUNOLOGY

## 2021-06-28 PROCEDURE — 99999 PR PBB SHADOW E&M-EST. PATIENT-LVL V: CPT | Mod: PBBFAC,,, | Performed by: ALLERGY & IMMUNOLOGY

## 2021-06-28 PROCEDURE — 99999 PR PBB SHADOW E&M-EST. PATIENT-LVL V: ICD-10-PCS | Mod: PBBFAC,,, | Performed by: ALLERGY & IMMUNOLOGY

## 2021-06-28 PROCEDURE — 86592 SYPHILIS TEST NON-TREP QUAL: CPT | Performed by: ALLERGY & IMMUNOLOGY

## 2021-06-28 PROCEDURE — 83520 IMMUNOASSAY QUANT NOS NONAB: CPT | Performed by: ALLERGY & IMMUNOLOGY

## 2021-06-28 PROCEDURE — 86352 CELL FUNCTION ASSAY W/STIM: CPT | Performed by: ALLERGY & IMMUNOLOGY

## 2021-06-28 PROCEDURE — 1101F PR PT FALLS ASSESS DOC 0-1 FALLS W/OUT INJ PAST YR: ICD-10-PCS | Mod: CPTII,S$GLB,, | Performed by: ALLERGY & IMMUNOLOGY

## 2021-06-28 PROCEDURE — 86703 HIV-1/HIV-2 1 RESULT ANTBDY: CPT | Performed by: ALLERGY & IMMUNOLOGY

## 2021-06-28 PROCEDURE — 86682 HELMINTH ANTIBODY: CPT | Performed by: ALLERGY & IMMUNOLOGY

## 2021-06-28 PROCEDURE — 1159F PR MEDICATION LIST DOCUMENTED IN MEDICAL RECORD: ICD-10-PCS | Mod: S$GLB,,, | Performed by: ALLERGY & IMMUNOLOGY

## 2021-06-28 PROCEDURE — 86705 HEP B CORE ANTIBODY IGM: CPT | Performed by: ALLERGY & IMMUNOLOGY

## 2021-06-28 PROCEDURE — 1101F PT FALLS ASSESS-DOCD LE1/YR: CPT | Mod: CPTII,S$GLB,, | Performed by: ALLERGY & IMMUNOLOGY

## 2021-06-28 PROCEDURE — 86038 ANTINUCLEAR ANTIBODIES: CPT | Performed by: ALLERGY & IMMUNOLOGY

## 2021-06-28 PROCEDURE — 86803 HEPATITIS C AB TEST: CPT | Performed by: ALLERGY & IMMUNOLOGY

## 2021-06-28 PROCEDURE — 1159F MED LIST DOCD IN RCRD: CPT | Mod: S$GLB,,, | Performed by: ALLERGY & IMMUNOLOGY

## 2021-06-28 PROCEDURE — 86039 ANTINUCLEAR ANTIBODIES (ANA): CPT | Performed by: ALLERGY & IMMUNOLOGY

## 2021-06-28 PROCEDURE — 86003 ALLG SPEC IGE CRUDE XTRC EA: CPT | Mod: 59 | Performed by: ALLERGY & IMMUNOLOGY

## 2021-06-28 PROCEDURE — 85025 COMPLETE CBC W/AUTO DIFF WBC: CPT | Performed by: ALLERGY & IMMUNOLOGY

## 2021-06-28 PROCEDURE — 86235 NUCLEAR ANTIGEN ANTIBODY: CPT | Mod: 59 | Performed by: ALLERGY & IMMUNOLOGY

## 2021-06-28 PROCEDURE — 3008F BODY MASS INDEX DOCD: CPT | Mod: CPTII,S$GLB,, | Performed by: ALLERGY & IMMUNOLOGY

## 2021-06-28 PROCEDURE — 99214 OFFICE O/P EST MOD 30 MIN: CPT | Mod: S$GLB,,, | Performed by: ALLERGY & IMMUNOLOGY

## 2021-06-28 PROCEDURE — 80053 COMPREHEN METABOLIC PANEL: CPT | Performed by: ALLERGY & IMMUNOLOGY

## 2021-06-28 PROCEDURE — 1126F AMNT PAIN NOTED NONE PRSNT: CPT | Mod: S$GLB,,, | Performed by: ALLERGY & IMMUNOLOGY

## 2021-06-28 PROCEDURE — 1126F PR PAIN SEVERITY QUANTIFIED, NO PAIN PRESENT: ICD-10-PCS | Mod: S$GLB,,, | Performed by: ALLERGY & IMMUNOLOGY

## 2021-06-28 PROCEDURE — 84165 PATHOLOGIST INTERPRETATION SPE: ICD-10-PCS | Mod: 26,,, | Performed by: PATHOLOGY

## 2021-06-28 PROCEDURE — 3288F PR FALLS RISK ASSESSMENT DOCUMENTED: ICD-10-PCS | Mod: CPTII,S$GLB,, | Performed by: ALLERGY & IMMUNOLOGY

## 2021-06-28 PROCEDURE — 84165 PROTEIN E-PHORESIS SERUM: CPT | Performed by: ALLERGY & IMMUNOLOGY

## 2021-06-28 PROCEDURE — 99214 PR OFFICE/OUTPT VISIT, EST, LEVL IV, 30-39 MIN: ICD-10-PCS | Mod: S$GLB,,, | Performed by: ALLERGY & IMMUNOLOGY

## 2021-06-28 PROCEDURE — 36415 COLL VENOUS BLD VENIPUNCTURE: CPT | Performed by: ALLERGY & IMMUNOLOGY

## 2021-06-28 PROCEDURE — 86682 HELMINTH ANTIBODY: CPT | Mod: 91 | Performed by: ALLERGY & IMMUNOLOGY

## 2021-06-28 PROCEDURE — 86003 ALLG SPEC IGE CRUDE XTRC EA: CPT | Performed by: ALLERGY & IMMUNOLOGY

## 2021-06-28 RX ORDER — MONTELUKAST SODIUM 10 MG/1
10 TABLET ORAL NIGHTLY
Qty: 30 TABLET | Refills: 5 | Status: SHIPPED | OUTPATIENT
Start: 2021-06-28 | End: 2021-07-28

## 2021-06-28 RX ORDER — EPINEPHRINE 0.3 MG/.3ML
1 INJECTION SUBCUTANEOUS ONCE
Qty: 2 DEVICE | Refills: 3 | Status: SHIPPED | OUTPATIENT
Start: 2021-06-28 | End: 2021-11-03 | Stop reason: SDUPTHER

## 2021-06-28 RX ORDER — LEVOCETIRIZINE DIHYDROCHLORIDE 5 MG/1
5 TABLET, FILM COATED ORAL NIGHTLY
Qty: 30 TABLET | Refills: 11 | Status: SHIPPED | OUTPATIENT
Start: 2021-06-28 | End: 2022-06-28

## 2021-06-29 LAB
ALBUMIN SERPL ELPH-MCNC: 4.34 G/DL (ref 3.35–5.55)
ALPHA1 GLOB SERPL ELPH-MCNC: 0.38 G/DL (ref 0.17–0.41)
ALPHA2 GLOB SERPL ELPH-MCNC: 0.66 G/DL (ref 0.43–0.99)
ANA PATTERN 1: NORMAL
ANA SER QL IF: POSITIVE
ANA TITR SER IF: NORMAL {TITER}
B-GLOBULIN SERPL ELPH-MCNC: 0.75 G/DL (ref 0.5–1.1)
GAMMA GLOB SERPL ELPH-MCNC: 1.07 G/DL (ref 0.67–1.58)
HBV CORE IGM SERPL QL IA: NEGATIVE
HCV AB SERPL QL IA: NEGATIVE
HIV 1+2 AB+HIV1 P24 AG SERPL QL IA: NEGATIVE
PATHOLOGIST INTERPRETATION SPE: NORMAL
PROT SERPL-MCNC: 7.2 G/DL (ref 6–8.4)
RPR SER QL: NORMAL
TRYPTASE LEVEL: 8.9 NG/ML

## 2021-06-30 LAB
ANTI SM ANTIBODY: 0.05 RATIO (ref 0–0.99)
ANTI SM/RNP ANTIBODY: 0.06 RATIO (ref 0–0.99)
ANTI-SM INTERPRETATION: NEGATIVE
ANTI-SM/RNP INTERPRETATION: NEGATIVE
ANTI-SSA ANTIBODY: 0.04 RATIO (ref 0–0.99)
ANTI-SSA INTERPRETATION: NEGATIVE
ANTI-SSB ANTIBODY: 0.06 RATIO (ref 0–0.99)
ANTI-SSB INTERPRETATION: NEGATIVE
DSDNA AB SER-ACNC: NORMAL [IU]/ML
STRONGYLOIDES ANTIBODY IGG: NEGATIVE

## 2021-07-01 LAB
CLAM IGE QN: <0.1 KU/L
CRAB IGE QN: <0.1 KU/L
DEPRECATED CLAM IGE RAST QL: NORMAL
DEPRECATED CRAB IGE RAST QL: NORMAL
DEPRECATED LOBSTER IGE RAST QL: NORMAL
DEPRECATED OYSTER IGE RAST QL: NORMAL
DEPRECATED SHRIMP IGE RAST QL: NORMAL
LOBSTER IGE QN: <0.1 KU/L
OYSTER IGE QN: <0.1 KU/L
SHRIMP IGE QN: <0.1 KU/L
T CANIS IGG SER QL IA: NEGATIVE

## 2021-07-02 LAB
A LUMBRIC IGE QN: <0.1 KU/L
CU INDEX: 5.9
CU, ANTI-THYROGLOBULIN IGG: <20 IU/ML
CU, ANTI-THYROID PEROXIDASE IGG: <10 IU/ML
CU, TSH (THYROTROPIN): 1.11 UIU/ML (ref 0.4–4)
DEPRECATED A LUMBRIC IGE RAST QL: 0

## 2021-08-02 ENCOUNTER — OFFICE VISIT (OUTPATIENT)
Dept: ALLERGY | Facility: CLINIC | Age: 76
End: 2021-08-02
Payer: COMMERCIAL

## 2021-08-02 VITALS
TEMPERATURE: 97 F | HEIGHT: 65 IN | HEART RATE: 99 BPM | SYSTOLIC BLOOD PRESSURE: 90 MMHG | DIASTOLIC BLOOD PRESSURE: 58 MMHG | WEIGHT: 123.25 LBS | BODY MASS INDEX: 20.54 KG/M2

## 2021-08-02 DIAGNOSIS — R22.0 LIP SWELLING: Primary | ICD-10-CM

## 2021-08-02 DIAGNOSIS — R06.03 RESPIRATORY DISTRESS: ICD-10-CM

## 2021-08-02 DIAGNOSIS — I95.0 IDIOPATHIC HYPOTENSION: ICD-10-CM

## 2021-08-02 PROCEDURE — 99999 PR PBB SHADOW E&M-EST. PATIENT-LVL IV: ICD-10-PCS | Mod: PBBFAC,,, | Performed by: ALLERGY & IMMUNOLOGY

## 2021-08-02 PROCEDURE — 3074F PR MOST RECENT SYSTOLIC BLOOD PRESSURE < 130 MM HG: ICD-10-PCS | Mod: CPTII,S$GLB,, | Performed by: ALLERGY & IMMUNOLOGY

## 2021-08-02 PROCEDURE — 1126F PR PAIN SEVERITY QUANTIFIED, NO PAIN PRESENT: ICD-10-PCS | Mod: CPTII,S$GLB,, | Performed by: ALLERGY & IMMUNOLOGY

## 2021-08-02 PROCEDURE — 1126F AMNT PAIN NOTED NONE PRSNT: CPT | Mod: CPTII,S$GLB,, | Performed by: ALLERGY & IMMUNOLOGY

## 2021-08-02 PROCEDURE — 1101F PT FALLS ASSESS-DOCD LE1/YR: CPT | Mod: CPTII,S$GLB,, | Performed by: ALLERGY & IMMUNOLOGY

## 2021-08-02 PROCEDURE — 3288F FALL RISK ASSESSMENT DOCD: CPT | Mod: CPTII,S$GLB,, | Performed by: ALLERGY & IMMUNOLOGY

## 2021-08-02 PROCEDURE — 99214 OFFICE O/P EST MOD 30 MIN: CPT | Mod: S$GLB,,, | Performed by: ALLERGY & IMMUNOLOGY

## 2021-08-02 PROCEDURE — 1101F PR PT FALLS ASSESS DOC 0-1 FALLS W/OUT INJ PAST YR: ICD-10-PCS | Mod: CPTII,S$GLB,, | Performed by: ALLERGY & IMMUNOLOGY

## 2021-08-02 PROCEDURE — 99214 PR OFFICE/OUTPT VISIT, EST, LEVL IV, 30-39 MIN: ICD-10-PCS | Mod: S$GLB,,, | Performed by: ALLERGY & IMMUNOLOGY

## 2021-08-02 PROCEDURE — 3288F PR FALLS RISK ASSESSMENT DOCUMENTED: ICD-10-PCS | Mod: CPTII,S$GLB,, | Performed by: ALLERGY & IMMUNOLOGY

## 2021-08-02 PROCEDURE — 1159F PR MEDICATION LIST DOCUMENTED IN MEDICAL RECORD: ICD-10-PCS | Mod: CPTII,S$GLB,, | Performed by: ALLERGY & IMMUNOLOGY

## 2021-08-02 PROCEDURE — 1159F MED LIST DOCD IN RCRD: CPT | Mod: CPTII,S$GLB,, | Performed by: ALLERGY & IMMUNOLOGY

## 2021-08-02 PROCEDURE — 3078F DIAST BP <80 MM HG: CPT | Mod: CPTII,S$GLB,, | Performed by: ALLERGY & IMMUNOLOGY

## 2021-08-02 PROCEDURE — 3074F SYST BP LT 130 MM HG: CPT | Mod: CPTII,S$GLB,, | Performed by: ALLERGY & IMMUNOLOGY

## 2021-08-02 PROCEDURE — 99999 PR PBB SHADOW E&M-EST. PATIENT-LVL IV: CPT | Mod: PBBFAC,,, | Performed by: ALLERGY & IMMUNOLOGY

## 2021-08-02 PROCEDURE — 3008F PR BODY MASS INDEX (BMI) DOCUMENTED: ICD-10-PCS | Mod: CPTII,S$GLB,, | Performed by: ALLERGY & IMMUNOLOGY

## 2021-08-02 PROCEDURE — 3078F PR MOST RECENT DIASTOLIC BLOOD PRESSURE < 80 MM HG: ICD-10-PCS | Mod: CPTII,S$GLB,, | Performed by: ALLERGY & IMMUNOLOGY

## 2021-08-02 PROCEDURE — 3008F BODY MASS INDEX DOCD: CPT | Mod: CPTII,S$GLB,, | Performed by: ALLERGY & IMMUNOLOGY

## 2021-08-04 ENCOUNTER — TELEPHONE (OUTPATIENT)
Dept: ALLERGY | Facility: CLINIC | Age: 76
End: 2021-08-04

## 2021-08-18 ENCOUNTER — TELEPHONE (OUTPATIENT)
Dept: HEPATOLOGY | Facility: CLINIC | Age: 76
End: 2021-08-18

## 2021-09-28 ENCOUNTER — IMMUNIZATION (OUTPATIENT)
Dept: PRIMARY CARE CLINIC | Facility: CLINIC | Age: 76
End: 2021-09-28
Payer: COMMERCIAL

## 2021-09-28 DIAGNOSIS — Z23 NEED FOR VACCINATION: Primary | ICD-10-CM

## 2021-09-28 PROCEDURE — 91300 COVID-19, MRNA, LNP-S, PF, 30 MCG/0.3 ML DOSE VACCINE: CPT | Mod: PBBFAC | Performed by: FAMILY MEDICINE

## 2021-09-28 PROCEDURE — 0003A COVID-19, MRNA, LNP-S, PF, 30 MCG/0.3 ML DOSE VACCINE: CPT | Mod: CV19,PBBFAC | Performed by: FAMILY MEDICINE

## 2021-10-04 ENCOUNTER — OFFICE VISIT (OUTPATIENT)
Dept: GASTROENTEROLOGY | Facility: CLINIC | Age: 76
End: 2021-10-04
Payer: COMMERCIAL

## 2021-10-04 ENCOUNTER — HOSPITAL ENCOUNTER (OUTPATIENT)
Dept: RADIOLOGY | Facility: HOSPITAL | Age: 76
Discharge: HOME | End: 2021-10-04
Attending: INTERNAL MEDICINE
Payer: COMMERCIAL

## 2021-10-04 VITALS
HEART RATE: 81 BPM | SYSTOLIC BLOOD PRESSURE: 106 MMHG | DIASTOLIC BLOOD PRESSURE: 64 MMHG | HEIGHT: 65 IN | OXYGEN SATURATION: 99 % | WEIGHT: 126.63 LBS | BODY MASS INDEX: 21.1 KG/M2

## 2021-10-04 DIAGNOSIS — R11.2 NON-INTRACTABLE VOMITING WITH NAUSEA, UNSPECIFIED VOMITING TYPE: ICD-10-CM

## 2021-10-04 DIAGNOSIS — K58.2 IRRITABLE BOWEL SYNDROME WITH BOTH CONSTIPATION AND DIARRHEA: ICD-10-CM

## 2021-10-04 DIAGNOSIS — K58.2 IRRITABLE BOWEL SYNDROME WITH BOTH CONSTIPATION AND DIARRHEA: Primary | ICD-10-CM

## 2021-10-04 DIAGNOSIS — Z91.018 FOOD ALLERGY: ICD-10-CM

## 2021-10-04 PROCEDURE — 3074F SYST BP LT 130 MM HG: CPT | Mod: CPTII,S$GLB,, | Performed by: INTERNAL MEDICINE

## 2021-10-04 PROCEDURE — 3078F DIAST BP <80 MM HG: CPT | Mod: CPTII,S$GLB,, | Performed by: INTERNAL MEDICINE

## 2021-10-04 PROCEDURE — 74019 XR ABDOMEN FLAT AND ERECT: ICD-10-PCS | Mod: 26,,, | Performed by: RADIOLOGY

## 2021-10-04 PROCEDURE — 3008F PR BODY MASS INDEX (BMI) DOCUMENTED: ICD-10-PCS | Mod: CPTII,S$GLB,, | Performed by: INTERNAL MEDICINE

## 2021-10-04 PROCEDURE — 99999 PR PBB SHADOW E&M-EST. PATIENT-LVL IV: ICD-10-PCS | Mod: PBBFAC,,, | Performed by: INTERNAL MEDICINE

## 2021-10-04 PROCEDURE — 4010F PR ACE/ARB THEARPY RXD/TAKEN: ICD-10-PCS | Mod: CPTII,S$GLB,, | Performed by: INTERNAL MEDICINE

## 2021-10-04 PROCEDURE — 99999 PR PBB SHADOW E&M-EST. PATIENT-LVL IV: CPT | Mod: PBBFAC,,, | Performed by: INTERNAL MEDICINE

## 2021-10-04 PROCEDURE — 3288F PR FALLS RISK ASSESSMENT DOCUMENTED: ICD-10-PCS | Mod: CPTII,S$GLB,, | Performed by: INTERNAL MEDICINE

## 2021-10-04 PROCEDURE — 99203 OFFICE O/P NEW LOW 30 MIN: CPT | Mod: S$GLB,,, | Performed by: INTERNAL MEDICINE

## 2021-10-04 PROCEDURE — 3078F PR MOST RECENT DIASTOLIC BLOOD PRESSURE < 80 MM HG: ICD-10-PCS | Mod: CPTII,S$GLB,, | Performed by: INTERNAL MEDICINE

## 2021-10-04 PROCEDURE — 3074F PR MOST RECENT SYSTOLIC BLOOD PRESSURE < 130 MM HG: ICD-10-PCS | Mod: CPTII,S$GLB,, | Performed by: INTERNAL MEDICINE

## 2021-10-04 PROCEDURE — 74019 RADEX ABDOMEN 2 VIEWS: CPT | Mod: 26,,, | Performed by: RADIOLOGY

## 2021-10-04 PROCEDURE — 4010F ACE/ARB THERAPY RXD/TAKEN: CPT | Mod: CPTII,S$GLB,, | Performed by: INTERNAL MEDICINE

## 2021-10-04 PROCEDURE — 3008F BODY MASS INDEX DOCD: CPT | Mod: CPTII,S$GLB,, | Performed by: INTERNAL MEDICINE

## 2021-10-04 PROCEDURE — 99203 PR OFFICE/OUTPT VISIT, NEW, LEVL III, 30-44 MIN: ICD-10-PCS | Mod: S$GLB,,, | Performed by: INTERNAL MEDICINE

## 2021-10-04 PROCEDURE — 74019 RADEX ABDOMEN 2 VIEWS: CPT | Mod: TC

## 2021-10-04 PROCEDURE — 3288F FALL RISK ASSESSMENT DOCD: CPT | Mod: CPTII,S$GLB,, | Performed by: INTERNAL MEDICINE

## 2021-10-04 PROCEDURE — 1101F PT FALLS ASSESS-DOCD LE1/YR: CPT | Mod: CPTII,S$GLB,, | Performed by: INTERNAL MEDICINE

## 2021-10-04 PROCEDURE — 1101F PR PT FALLS ASSESS DOC 0-1 FALLS W/OUT INJ PAST YR: ICD-10-PCS | Mod: CPTII,S$GLB,, | Performed by: INTERNAL MEDICINE

## 2021-10-05 ENCOUNTER — LAB VISIT (OUTPATIENT)
Dept: LAB | Facility: HOSPITAL | Age: 76
End: 2021-10-05
Attending: ALLERGY & IMMUNOLOGY
Payer: COMMERCIAL

## 2021-10-05 ENCOUNTER — OFFICE VISIT (OUTPATIENT)
Dept: ALLERGY | Facility: CLINIC | Age: 76
End: 2021-10-05
Payer: COMMERCIAL

## 2021-10-05 VITALS
HEIGHT: 65 IN | SYSTOLIC BLOOD PRESSURE: 126 MMHG | HEART RATE: 75 BPM | BODY MASS INDEX: 21.09 KG/M2 | TEMPERATURE: 98 F | DIASTOLIC BLOOD PRESSURE: 68 MMHG | WEIGHT: 126.56 LBS

## 2021-10-05 DIAGNOSIS — R22.0 LIP SWELLING: Primary | ICD-10-CM

## 2021-10-05 DIAGNOSIS — R22.0 LIP SWELLING: ICD-10-CM

## 2021-10-05 LAB
ALBUMIN SERPL BCP-MCNC: 4.1 G/DL (ref 3.5–5.2)
ALP SERPL-CCNC: 50 U/L (ref 55–135)
ALT SERPL W/O P-5'-P-CCNC: 23 U/L (ref 10–44)
ANION GAP SERPL CALC-SCNC: 14 MMOL/L (ref 8–16)
AST SERPL-CCNC: 28 U/L (ref 10–40)
BASOPHILS # BLD AUTO: 0.06 K/UL (ref 0–0.2)
BASOPHILS NFR BLD: 1.1 % (ref 0–1.9)
BILIRUB SERPL-MCNC: 0.4 MG/DL (ref 0.1–1)
BUN SERPL-MCNC: 12 MG/DL (ref 8–23)
C3 SERPL-MCNC: 117 MG/DL (ref 50–180)
C4 SERPL-MCNC: 32 MG/DL (ref 11–44)
CALCIUM SERPL-MCNC: 9.6 MG/DL (ref 8.7–10.5)
CHLORIDE SERPL-SCNC: 100 MMOL/L (ref 95–110)
CO2 SERPL-SCNC: 23 MMOL/L (ref 23–29)
CREAT SERPL-MCNC: 1.2 MG/DL (ref 0.5–1.4)
DIFFERENTIAL METHOD: ABNORMAL
EOSINOPHIL # BLD AUTO: 0.1 K/UL (ref 0–0.5)
EOSINOPHIL NFR BLD: 1.5 % (ref 0–8)
ERYTHROCYTE [DISTWIDTH] IN BLOOD BY AUTOMATED COUNT: 12.6 % (ref 11.5–14.5)
EST. GFR  (AFRICAN AMERICAN): 51.8 ML/MIN/1.73 M^2
EST. GFR  (NON AFRICAN AMERICAN): 44.9 ML/MIN/1.73 M^2
GLUCOSE SERPL-MCNC: 77 MG/DL (ref 70–110)
HCT VFR BLD AUTO: 41.1 % (ref 37–48.5)
HGB BLD-MCNC: 12.9 G/DL (ref 12–16)
IMM GRANULOCYTES # BLD AUTO: 0.02 K/UL (ref 0–0.04)
IMM GRANULOCYTES NFR BLD AUTO: 0.4 % (ref 0–0.5)
LYMPHOCYTES # BLD AUTO: 1.1 K/UL (ref 1–4.8)
LYMPHOCYTES NFR BLD: 19.6 % (ref 18–48)
MCH RBC QN AUTO: 28.1 PG (ref 27–31)
MCHC RBC AUTO-ENTMCNC: 31.4 G/DL (ref 32–36)
MCV RBC AUTO: 90 FL (ref 82–98)
MONOCYTES # BLD AUTO: 0.4 K/UL (ref 0.3–1)
MONOCYTES NFR BLD: 6.8 % (ref 4–15)
NEUTROPHILS # BLD AUTO: 3.9 K/UL (ref 1.8–7.7)
NEUTROPHILS NFR BLD: 70.6 % (ref 38–73)
NRBC BLD-RTO: 0 /100 WBC
PLATELET # BLD AUTO: 336 K/UL (ref 150–450)
PMV BLD AUTO: 9.8 FL (ref 9.2–12.9)
POTASSIUM SERPL-SCNC: 3.5 MMOL/L (ref 3.5–5.1)
PROT SERPL-MCNC: 7.6 G/DL (ref 6–8.4)
RBC # BLD AUTO: 4.59 M/UL (ref 4–5.4)
SODIUM SERPL-SCNC: 137 MMOL/L (ref 136–145)
WBC # BLD AUTO: 5.47 K/UL (ref 3.9–12.7)

## 2021-10-05 PROCEDURE — 3078F DIAST BP <80 MM HG: CPT | Mod: CPTII,S$GLB,, | Performed by: ALLERGY & IMMUNOLOGY

## 2021-10-05 PROCEDURE — 1126F AMNT PAIN NOTED NONE PRSNT: CPT | Mod: CPTII,S$GLB,, | Performed by: ALLERGY & IMMUNOLOGY

## 2021-10-05 PROCEDURE — 86161 COMPLEMENT/FUNCTION ACTIVITY: CPT | Performed by: ALLERGY & IMMUNOLOGY

## 2021-10-05 PROCEDURE — 84165 PATHOLOGIST INTERPRETATION SPE: ICD-10-PCS | Mod: 26,,, | Performed by: PATHOLOGY

## 2021-10-05 PROCEDURE — 1126F PR PAIN SEVERITY QUANTIFIED, NO PAIN PRESENT: ICD-10-PCS | Mod: CPTII,S$GLB,, | Performed by: ALLERGY & IMMUNOLOGY

## 2021-10-05 PROCEDURE — 3074F PR MOST RECENT SYSTOLIC BLOOD PRESSURE < 130 MM HG: ICD-10-PCS | Mod: CPTII,S$GLB,, | Performed by: ALLERGY & IMMUNOLOGY

## 2021-10-05 PROCEDURE — 3074F SYST BP LT 130 MM HG: CPT | Mod: CPTII,S$GLB,, | Performed by: ALLERGY & IMMUNOLOGY

## 2021-10-05 PROCEDURE — 99214 PR OFFICE/OUTPT VISIT, EST, LEVL IV, 30-39 MIN: ICD-10-PCS | Mod: S$GLB,,, | Performed by: ALLERGY & IMMUNOLOGY

## 2021-10-05 PROCEDURE — 1101F PR PT FALLS ASSESS DOC 0-1 FALLS W/OUT INJ PAST YR: ICD-10-PCS | Mod: CPTII,S$GLB,, | Performed by: ALLERGY & IMMUNOLOGY

## 2021-10-05 PROCEDURE — 4010F PR ACE/ARB THEARPY RXD/TAKEN: ICD-10-PCS | Mod: CPTII,S$GLB,, | Performed by: ALLERGY & IMMUNOLOGY

## 2021-10-05 PROCEDURE — 3008F PR BODY MASS INDEX (BMI) DOCUMENTED: ICD-10-PCS | Mod: CPTII,S$GLB,, | Performed by: ALLERGY & IMMUNOLOGY

## 2021-10-05 PROCEDURE — 1159F PR MEDICATION LIST DOCUMENTED IN MEDICAL RECORD: ICD-10-PCS | Mod: CPTII,S$GLB,, | Performed by: ALLERGY & IMMUNOLOGY

## 2021-10-05 PROCEDURE — 99214 OFFICE O/P EST MOD 30 MIN: CPT | Mod: S$GLB,,, | Performed by: ALLERGY & IMMUNOLOGY

## 2021-10-05 PROCEDURE — 86332 IMMUNE COMPLEX ASSAY: CPT | Performed by: ALLERGY & IMMUNOLOGY

## 2021-10-05 PROCEDURE — 3288F PR FALLS RISK ASSESSMENT DOCUMENTED: ICD-10-PCS | Mod: CPTII,S$GLB,, | Performed by: ALLERGY & IMMUNOLOGY

## 2021-10-05 PROCEDURE — 3078F PR MOST RECENT DIASTOLIC BLOOD PRESSURE < 80 MM HG: ICD-10-PCS | Mod: CPTII,S$GLB,, | Performed by: ALLERGY & IMMUNOLOGY

## 2021-10-05 PROCEDURE — 86160 COMPLEMENT ANTIGEN: CPT | Mod: 59 | Performed by: ALLERGY & IMMUNOLOGY

## 2021-10-05 PROCEDURE — 80053 COMPREHEN METABOLIC PANEL: CPT | Performed by: ALLERGY & IMMUNOLOGY

## 2021-10-05 PROCEDURE — 4010F ACE/ARB THERAPY RXD/TAKEN: CPT | Mod: CPTII,S$GLB,, | Performed by: ALLERGY & IMMUNOLOGY

## 2021-10-05 PROCEDURE — 1101F PT FALLS ASSESS-DOCD LE1/YR: CPT | Mod: CPTII,S$GLB,, | Performed by: ALLERGY & IMMUNOLOGY

## 2021-10-05 PROCEDURE — 84165 PROTEIN E-PHORESIS SERUM: CPT | Mod: 26,,, | Performed by: PATHOLOGY

## 2021-10-05 PROCEDURE — 86160 COMPLEMENT ANTIGEN: CPT | Performed by: ALLERGY & IMMUNOLOGY

## 2021-10-05 PROCEDURE — 84165 PROTEIN E-PHORESIS SERUM: CPT | Performed by: ALLERGY & IMMUNOLOGY

## 2021-10-05 PROCEDURE — 99999 PR PBB SHADOW E&M-EST. PATIENT-LVL V: CPT | Mod: PBBFAC,,, | Performed by: ALLERGY & IMMUNOLOGY

## 2021-10-05 PROCEDURE — 85025 COMPLETE CBC W/AUTO DIFF WBC: CPT | Performed by: ALLERGY & IMMUNOLOGY

## 2021-10-05 PROCEDURE — 99999 PR PBB SHADOW E&M-EST. PATIENT-LVL V: ICD-10-PCS | Mod: PBBFAC,,, | Performed by: ALLERGY & IMMUNOLOGY

## 2021-10-05 PROCEDURE — 1159F MED LIST DOCD IN RCRD: CPT | Mod: CPTII,S$GLB,, | Performed by: ALLERGY & IMMUNOLOGY

## 2021-10-05 PROCEDURE — 3008F BODY MASS INDEX DOCD: CPT | Mod: CPTII,S$GLB,, | Performed by: ALLERGY & IMMUNOLOGY

## 2021-10-05 PROCEDURE — 3288F FALL RISK ASSESSMENT DOCD: CPT | Mod: CPTII,S$GLB,, | Performed by: ALLERGY & IMMUNOLOGY

## 2021-10-06 LAB
ALBUMIN SERPL ELPH-MCNC: 4.26 G/DL (ref 3.35–5.55)
ALPHA1 GLOB SERPL ELPH-MCNC: 0.45 G/DL (ref 0.17–0.41)
ALPHA2 GLOB SERPL ELPH-MCNC: 0.7 G/DL (ref 0.43–0.99)
B-GLOBULIN SERPL ELPH-MCNC: 0.82 G/DL (ref 0.5–1.1)
GAMMA GLOB SERPL ELPH-MCNC: 1.08 G/DL (ref 0.67–1.58)
PROT SERPL-MCNC: 7.3 G/DL (ref 6–8.4)

## 2021-10-07 LAB
C1INH FUNCTIONAL/C1INH TOTAL MFR SERPL: >100 %
PATHOLOGIST INTERPRETATION SPE: NORMAL

## 2021-10-08 LAB — C1INH SERPL-MCNC: 37 MG/DL (ref 21–39)

## 2021-10-10 LAB — IC SERPL C1Q BIND-ACNC: 1.4 UG EQ/ML (ref 0–3.9)

## 2021-10-11 ENCOUNTER — TELEPHONE (OUTPATIENT)
Dept: GASTROENTEROLOGY | Facility: CLINIC | Age: 76
End: 2021-10-11

## 2021-10-11 DIAGNOSIS — R11.2 NON-INTRACTABLE VOMITING WITH NAUSEA, UNSPECIFIED VOMITING TYPE: Primary | ICD-10-CM

## 2021-10-18 ENCOUNTER — LAB VISIT (OUTPATIENT)
Dept: LAB | Facility: HOSPITAL | Age: 76
End: 2021-10-18
Attending: INTERNAL MEDICINE
Payer: COMMERCIAL

## 2021-10-18 DIAGNOSIS — R11.2 NON-INTRACTABLE VOMITING WITH NAUSEA, UNSPECIFIED VOMITING TYPE: ICD-10-CM

## 2021-10-18 PROCEDURE — 87338 HPYLORI STOOL AG IA: CPT | Performed by: INTERNAL MEDICINE

## 2021-10-26 LAB
H PYLORI AG STL QL IA: NORMAL
SPECIMEN SOURCE: NORMAL

## 2021-11-01 ENCOUNTER — TELEPHONE (OUTPATIENT)
Dept: ALLERGY | Facility: CLINIC | Age: 76
End: 2021-11-01
Payer: COMMERCIAL

## 2021-11-03 DIAGNOSIS — Z91.018 FOOD ALLERGY: ICD-10-CM

## 2021-11-03 RX ORDER — EPINEPHRINE 0.3 MG/.3ML
1 INJECTION SUBCUTANEOUS ONCE
Qty: 2 EACH | Refills: 3 | Status: SHIPPED | OUTPATIENT
Start: 2021-11-03 | End: 2021-11-03

## 2021-11-19 ENCOUNTER — OFFICE VISIT (OUTPATIENT)
Dept: ALLERGY | Facility: CLINIC | Age: 76
End: 2021-11-19
Payer: COMMERCIAL

## 2021-11-19 VITALS
SYSTOLIC BLOOD PRESSURE: 130 MMHG | HEIGHT: 65 IN | BODY MASS INDEX: 20.09 KG/M2 | HEART RATE: 76 BPM | WEIGHT: 120.56 LBS | DIASTOLIC BLOOD PRESSURE: 73 MMHG | TEMPERATURE: 98 F

## 2021-11-19 DIAGNOSIS — R22.0 FACIAL SWELLING: Primary | ICD-10-CM

## 2021-11-19 DIAGNOSIS — R22.0 LIP SWELLING: ICD-10-CM

## 2021-11-19 PROCEDURE — 3075F SYST BP GE 130 - 139MM HG: CPT | Mod: CPTII,S$GLB,, | Performed by: ALLERGY & IMMUNOLOGY

## 2021-11-19 PROCEDURE — 1101F PR PT FALLS ASSESS DOC 0-1 FALLS W/OUT INJ PAST YR: ICD-10-PCS | Mod: CPTII,S$GLB,, | Performed by: ALLERGY & IMMUNOLOGY

## 2021-11-19 PROCEDURE — 3008F PR BODY MASS INDEX (BMI) DOCUMENTED: ICD-10-PCS | Mod: CPTII,S$GLB,, | Performed by: ALLERGY & IMMUNOLOGY

## 2021-11-19 PROCEDURE — 3288F FALL RISK ASSESSMENT DOCD: CPT | Mod: CPTII,S$GLB,, | Performed by: ALLERGY & IMMUNOLOGY

## 2021-11-19 PROCEDURE — 99999 PR PBB SHADOW E&M-EST. PATIENT-LVL V: CPT | Mod: PBBFAC,,, | Performed by: ALLERGY & IMMUNOLOGY

## 2021-11-19 PROCEDURE — 99214 PR OFFICE/OUTPT VISIT, EST, LEVL IV, 30-39 MIN: ICD-10-PCS | Mod: S$GLB,,, | Performed by: ALLERGY & IMMUNOLOGY

## 2021-11-19 PROCEDURE — 4010F ACE/ARB THERAPY RXD/TAKEN: CPT | Mod: CPTII,S$GLB,, | Performed by: ALLERGY & IMMUNOLOGY

## 2021-11-19 PROCEDURE — 3008F BODY MASS INDEX DOCD: CPT | Mod: CPTII,S$GLB,, | Performed by: ALLERGY & IMMUNOLOGY

## 2021-11-19 PROCEDURE — 3078F DIAST BP <80 MM HG: CPT | Mod: CPTII,S$GLB,, | Performed by: ALLERGY & IMMUNOLOGY

## 2021-11-19 PROCEDURE — 1101F PT FALLS ASSESS-DOCD LE1/YR: CPT | Mod: CPTII,S$GLB,, | Performed by: ALLERGY & IMMUNOLOGY

## 2021-11-19 PROCEDURE — 1126F AMNT PAIN NOTED NONE PRSNT: CPT | Mod: CPTII,S$GLB,, | Performed by: ALLERGY & IMMUNOLOGY

## 2021-11-19 PROCEDURE — 3078F PR MOST RECENT DIASTOLIC BLOOD PRESSURE < 80 MM HG: ICD-10-PCS | Mod: CPTII,S$GLB,, | Performed by: ALLERGY & IMMUNOLOGY

## 2021-11-19 PROCEDURE — 1159F MED LIST DOCD IN RCRD: CPT | Mod: CPTII,S$GLB,, | Performed by: ALLERGY & IMMUNOLOGY

## 2021-11-19 PROCEDURE — 3075F PR MOST RECENT SYSTOLIC BLOOD PRESS GE 130-139MM HG: ICD-10-PCS | Mod: CPTII,S$GLB,, | Performed by: ALLERGY & IMMUNOLOGY

## 2021-11-19 PROCEDURE — 4010F PR ACE/ARB THEARPY RXD/TAKEN: ICD-10-PCS | Mod: CPTII,S$GLB,, | Performed by: ALLERGY & IMMUNOLOGY

## 2021-11-19 PROCEDURE — 3288F PR FALLS RISK ASSESSMENT DOCUMENTED: ICD-10-PCS | Mod: CPTII,S$GLB,, | Performed by: ALLERGY & IMMUNOLOGY

## 2021-11-19 PROCEDURE — 1159F PR MEDICATION LIST DOCUMENTED IN MEDICAL RECORD: ICD-10-PCS | Mod: CPTII,S$GLB,, | Performed by: ALLERGY & IMMUNOLOGY

## 2021-11-19 PROCEDURE — 1126F PR PAIN SEVERITY QUANTIFIED, NO PAIN PRESENT: ICD-10-PCS | Mod: CPTII,S$GLB,, | Performed by: ALLERGY & IMMUNOLOGY

## 2021-11-19 PROCEDURE — 99999 PR PBB SHADOW E&M-EST. PATIENT-LVL V: ICD-10-PCS | Mod: PBBFAC,,, | Performed by: ALLERGY & IMMUNOLOGY

## 2021-11-19 PROCEDURE — 99214 OFFICE O/P EST MOD 30 MIN: CPT | Mod: S$GLB,,, | Performed by: ALLERGY & IMMUNOLOGY

## 2022-01-31 ENCOUNTER — OFFICE VISIT (OUTPATIENT)
Dept: OPHTHALMOLOGY | Facility: CLINIC | Age: 77
End: 2022-01-31
Payer: COMMERCIAL

## 2022-01-31 DIAGNOSIS — H04.129 DRY EYE: ICD-10-CM

## 2022-01-31 DIAGNOSIS — Z96.1 PSEUDOPHAKIA OF BOTH EYES: Primary | ICD-10-CM

## 2022-01-31 PROCEDURE — 99999 PR PBB SHADOW E&M-EST. PATIENT-LVL III: CPT | Mod: PBBFAC,,, | Performed by: OPHTHALMOLOGY

## 2022-01-31 PROCEDURE — 1160F PR REVIEW ALL MEDS BY PRESCRIBER/CLIN PHARMACIST DOCUMENTED: ICD-10-PCS | Mod: CPTII,S$GLB,, | Performed by: OPHTHALMOLOGY

## 2022-01-31 PROCEDURE — 1160F RVW MEDS BY RX/DR IN RCRD: CPT | Mod: CPTII,S$GLB,, | Performed by: OPHTHALMOLOGY

## 2022-01-31 PROCEDURE — 92014 COMPRE OPH EXAM EST PT 1/>: CPT | Mod: S$GLB,,, | Performed by: OPHTHALMOLOGY

## 2022-01-31 PROCEDURE — 92014 PR EYE EXAM, EST PATIENT,COMPREHESV: ICD-10-PCS | Mod: S$GLB,,, | Performed by: OPHTHALMOLOGY

## 2022-01-31 PROCEDURE — 99999 PR PBB SHADOW E&M-EST. PATIENT-LVL III: ICD-10-PCS | Mod: PBBFAC,,, | Performed by: OPHTHALMOLOGY

## 2022-01-31 PROCEDURE — 1159F MED LIST DOCD IN RCRD: CPT | Mod: CPTII,S$GLB,, | Performed by: OPHTHALMOLOGY

## 2022-01-31 PROCEDURE — 1159F PR MEDICATION LIST DOCUMENTED IN MEDICAL RECORD: ICD-10-PCS | Mod: CPTII,S$GLB,, | Performed by: OPHTHALMOLOGY

## 2022-01-31 NOTE — PROGRESS NOTES
SUBJECTIVE  Saskia Torres is 76 y.o. female  Uncorrected distance visual acuity was 20/40 in the right eye and 20/40 in the left eye.   Chief Complaint   Patient presents with    Annual Exam     Patient reports for annual eye exam. Patient reports of blurred vision in both eyes, states she uses glasses for reading. States blurred vision comes and goes. Reports of dryness OU, does not use artifical tears for relief.          HPI     Annual Exam      Additional comments: Patient reports for annual eye exam. Patient   reports of blurred vision in both eyes, states she uses glasses for   reading. States blurred vision comes and goes. Reports of dryness OU, does   not use artifical tears for relief.              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 PCIOL OS Toric 3/18/15  PCIOL OD Toric   2. H/O hayfever conjunctivitis    Pataday PRN  Famciclovir 500 mg BID            Last edited by Diego Delgado on 1/31/2022  3:29 PM. (History)         Assessment /Plan :  1. Pseudophakia of both eyes  -- Condition stable, no therapeutic change required. Monitoring routinely.   2. Dry eye Findings and symptoms consistent with moderate dry eyes. Dry eye instruction sheet reviewed with patient recommending:AT's qid/titrate prn, Systane Ultra prn and [place] 3 Fish Oils [id number] mg po bid     RTC in 1 year or prn any changes

## 2022-04-12 ENCOUNTER — IMMUNIZATION (OUTPATIENT)
Dept: PRIMARY CARE CLINIC | Facility: CLINIC | Age: 77
End: 2022-04-12

## 2022-04-12 DIAGNOSIS — Z23 NEED FOR VACCINATION: Primary | ICD-10-CM

## 2022-04-12 PROCEDURE — 91305 COVID-19, MRNA, LNP-S, PF, 30 MCG/0.3 ML DOSE VACCINE (PFIZER): CPT | Mod: PBBFAC | Performed by: FAMILY MEDICINE

## 2022-05-04 DIAGNOSIS — Z12.31 OTHER SCREENING MAMMOGRAM: ICD-10-CM

## 2023-07-24 ENCOUNTER — OFFICE VISIT (OUTPATIENT)
Dept: OPHTHALMOLOGY | Facility: CLINIC | Age: 78
End: 2023-07-24
Payer: MEDICARE

## 2023-07-24 DIAGNOSIS — Z96.1 PSEUDOPHAKIA OF BOTH EYES: Primary | ICD-10-CM

## 2023-07-24 PROCEDURE — 99213 OFFICE O/P EST LOW 20 MIN: CPT | Mod: PBBFAC | Performed by: OPHTHALMOLOGY

## 2023-07-24 PROCEDURE — 99999 PR PBB SHADOW E&M-EST. PATIENT-LVL III: ICD-10-PCS | Mod: PBBFAC,,, | Performed by: OPHTHALMOLOGY

## 2023-07-24 PROCEDURE — 92014 COMPRE OPH EXAM EST PT 1/>: CPT | Mod: S$PBB,,, | Performed by: OPHTHALMOLOGY

## 2023-07-24 PROCEDURE — 99999 PR PBB SHADOW E&M-EST. PATIENT-LVL III: CPT | Mod: PBBFAC,,, | Performed by: OPHTHALMOLOGY

## 2023-07-24 PROCEDURE — 92014 PR EYE EXAM, EST PATIENT,COMPREHESV: ICD-10-PCS | Mod: S$PBB,,, | Performed by: OPHTHALMOLOGY

## 2023-07-24 NOTE — PROGRESS NOTES
SUBJECTIVE  Saskia Torres is 77 y.o. female  Uncorrected distance visual acuity was 20/25 -2 in the right eye and 20/30 -2 in the left eye. Uncorrected near visual acuity was J3 in the right eye and J2 in the left eye.   Chief Complaint   Patient presents with    Annual Exam     Vision changes since last eye exam?: Having more blurriness    Any eye pain today: No    Other ocular symptoms: No                         HPI     Annual Exam     Additional comments: Vision changes since last eye exam?: Having more   blurriness    Any eye pain today: No    Other ocular symptoms: No                          Comments        1. PCIOL OS Toric 3/18/15  PCIOL OD Toric   2. H/O hayfever conjunctivitis    Pataday PRN (not using currently)              Last edited by Ankur Gan on 7/24/2023  8:00 AM.         Assessment /Plan :  1. Pseudophakia of both eyes  -- Condition stable, no therapeutic change required. Monitoring routinely.     RTC in 1 year or prn any changes

## 2024-01-11 ENCOUNTER — TELEPHONE (OUTPATIENT)
Dept: OPHTHALMOLOGY | Facility: CLINIC | Age: 79
End: 2024-01-11
Payer: MEDICARE

## 2024-01-11 NOTE — TELEPHONE ENCOUNTER
Called patient and left V/M in regards to booking out appointment with [facility]. Appointment letter mailed to patient's address.

## 2024-02-16 NOTE — PROGRESS NOTES
scribed in the presence of Dr. Ward RUCKER by Kaylyn Loja, RN.     Physician Attestation:  The scribes documentation has been prepared under my direction and personally reviewed by me in its entirety.     I confirm the note above accurately reflects all work, treatment, procedures, and medical decision making performed by me.    Electronically signed by Sam Hopper MD on 2/20/2024 at 9:57 AM

## 2024-02-19 ENCOUNTER — OFFICE VISIT (OUTPATIENT)
Dept: CARDIOLOGY CLINIC | Age: 79
End: 2024-02-19
Payer: COMMERCIAL

## 2024-02-19 VITALS
OXYGEN SATURATION: 97 % | HEART RATE: 98 BPM | DIASTOLIC BLOOD PRESSURE: 60 MMHG | SYSTOLIC BLOOD PRESSURE: 118 MMHG | WEIGHT: 135.4 LBS

## 2024-02-19 DIAGNOSIS — R06.00 DYSPNEA, UNSPECIFIED TYPE: Primary | ICD-10-CM

## 2024-02-19 DIAGNOSIS — I10 HYPERTENSION, UNSPECIFIED TYPE: ICD-10-CM

## 2024-02-19 DIAGNOSIS — R00.2 HEART PALPITATIONS: ICD-10-CM

## 2024-02-19 DIAGNOSIS — E78.2 MIXED HYPERLIPIDEMIA: ICD-10-CM

## 2024-02-19 DIAGNOSIS — R07.9 CHEST PAIN, UNSPECIFIED TYPE: ICD-10-CM

## 2024-02-19 DIAGNOSIS — Z76.89 ENCOUNTER TO ESTABLISH CARE: ICD-10-CM

## 2024-02-19 PROCEDURE — 99204 OFFICE O/P NEW MOD 45 MIN: CPT | Performed by: INTERNAL MEDICINE

## 2024-02-19 PROCEDURE — 3078F DIAST BP <80 MM HG: CPT | Performed by: INTERNAL MEDICINE

## 2024-02-19 PROCEDURE — 1123F ACP DISCUSS/DSCN MKR DOCD: CPT | Performed by: INTERNAL MEDICINE

## 2024-02-19 PROCEDURE — 93000 ELECTROCARDIOGRAM COMPLETE: CPT | Performed by: INTERNAL MEDICINE

## 2024-02-19 PROCEDURE — 3074F SYST BP LT 130 MM HG: CPT | Performed by: INTERNAL MEDICINE

## 2024-02-19 RX ORDER — LEVOTHYROXINE SODIUM 0.03 MG/1
25 TABLET ORAL
COMMUNITY

## 2024-02-19 RX ORDER — BUPROPION HYDROCHLORIDE 150 MG/1
TABLET ORAL
COMMUNITY
Start: 2023-12-29

## 2024-02-19 RX ORDER — EPINEPHRINE 0.3 MG/.3ML
0.3 INJECTION SUBCUTANEOUS ONCE
COMMUNITY
Start: 2021-11-03

## 2024-02-19 RX ORDER — EZETIMIBE AND SIMVASTATIN 10; 20 MG/1; MG/1
1 TABLET ORAL EVERY OTHER DAY
COMMUNITY

## 2024-02-19 RX ORDER — FLUCONAZOLE 150 MG/1
TABLET ORAL
COMMUNITY

## 2024-02-19 RX ORDER — POTASSIUM CHLORIDE 20 MEQ/1
20 TABLET, EXTENDED RELEASE ORAL DAILY
COMMUNITY

## 2024-02-19 NOTE — PATIENT INSTRUCTIONS
Stress test Myoview   Echocardiogram   7 day cardiac event monitor   Send lab results from PCP office  Follow up with me after testing

## 2024-02-20 ENCOUNTER — TELEPHONE (OUTPATIENT)
Dept: CARDIOLOGY CLINIC | Age: 79
End: 2024-02-20

## 2024-03-27 ENCOUNTER — HOSPITAL ENCOUNTER (OUTPATIENT)
Dept: NON INVASIVE DIAGNOSTICS | Age: 79
Discharge: HOME | End: 2024-03-27
Attending: INTERNAL MEDICINE
Payer: MEDICARE

## 2024-03-27 DIAGNOSIS — R06.00 DYSPNEA, UNSPECIFIED TYPE: ICD-10-CM

## 2024-03-27 DIAGNOSIS — R07.9 CHEST PAIN, UNSPECIFIED TYPE: ICD-10-CM

## 2024-03-27 PROCEDURE — A9502 TC99M TETROFOSMIN: HCPCS | Performed by: INTERNAL MEDICINE

## 2024-03-27 PROCEDURE — 78452 HT MUSCLE IMAGE SPECT MULT: CPT

## 2024-03-27 PROCEDURE — 93017 CV STRESS TEST TRACING ONLY: CPT

## 2024-03-27 PROCEDURE — 3430000000 HC RX DIAGNOSTIC RADIOPHARMACEUTICAL: Performed by: INTERNAL MEDICINE

## 2024-03-27 RX ADMIN — TETROFOSMIN 10 MILLICURIE: 1.38 INJECTION, POWDER, LYOPHILIZED, FOR SOLUTION INTRAVENOUS at 10:16

## 2024-03-27 RX ADMIN — TETROFOSMIN 30 MILLICURIE: 1.38 INJECTION, POWDER, LYOPHILIZED, FOR SOLUTION INTRAVENOUS at 11:15

## 2024-03-28 ENCOUNTER — HOSPITAL ENCOUNTER (OUTPATIENT)
Dept: NON INVASIVE DIAGNOSTICS | Age: 79
Discharge: HOME | End: 2024-03-28
Payer: MEDICARE

## 2024-03-28 DIAGNOSIS — R07.9 CHEST PAIN, UNSPECIFIED TYPE: ICD-10-CM

## 2024-03-28 DIAGNOSIS — R06.00 DYSPNEA, UNSPECIFIED TYPE: ICD-10-CM

## 2024-03-28 PROCEDURE — 93306 TTE W/DOPPLER COMPLETE: CPT

## 2024-03-28 NOTE — PROGRESS NOTES
[facility]     Outpatient Cardiology         Patient Name:  Lisa Rios  Requesting Physician: No admitting provider for patient encounter.  Primary Care Physician: No primary care provider on file.    Reason for Consultation/Chief Complaint:   Chief Complaint   Patient presents with    Follow-up       HPI:     Lisa Rios is a 78 y.o. female with a history of palpitations, HTN, HLD and vasovagal syncope.      Today she is here for follow up after recent echo and Myoview. She wore a monitor, but when she returned it to our office, it was missing the chip that contained all the data. She does not recall seeing the chip at home. She does report episodes of SOB, however, she hasn't noticed palpitations. She has noticed that when she gets stressed, she notices the SOB because she holds her breath. She is requesting a PCP referral.    Myoview 3/27/24:  Summary   There is normal isotope uptake at stress and rest. There is no evidence of   myocardial ischemia or scar.   Normal LV function.   This is a low risk cardiac scan.    Echo 3/28/24:  Summary   The left ventricle is normal in size with normal wall thickness.   Left ventricular systolic function is normal with a visually estimated   ejection fraction of 55-60%.   No obvious regional wall motion abnormalities noted.   Diastolic filling parameters suggests normal diastolic function .   The tricuspid valve is normal in structure.   Mild tricuspid regurgitation.   Systolic pulmonary artery pressure (SPAP) is normal and estimated at 32 mmHg   (right atrial pressure 3 mmHg).     Reports cath in 2003 with minimal plaque     Former smoker - quit 40 years ago.     1/29/24:     Histories:     Past Medical History:   has no past medical history on file.    Surgical History:   has no past surgical history on file.     Social History:   reports that she quit smoking about 40 years ago. Her smoking use included cigarettes. She has never used

## 2024-04-02 ENCOUNTER — OFFICE VISIT (OUTPATIENT)
Dept: CARDIOLOGY CLINIC | Age: 79
End: 2024-04-02
Payer: MEDICARE

## 2024-04-02 VITALS
WEIGHT: 133.6 LBS | SYSTOLIC BLOOD PRESSURE: 124 MMHG | DIASTOLIC BLOOD PRESSURE: 64 MMHG | HEART RATE: 94 BPM | OXYGEN SATURATION: 98 %

## 2024-04-02 DIAGNOSIS — E78.2 MIXED HYPERLIPIDEMIA: ICD-10-CM

## 2024-04-02 DIAGNOSIS — R06.00 DYSPNEA, UNSPECIFIED TYPE: Primary | ICD-10-CM

## 2024-04-02 DIAGNOSIS — I10 HYPERTENSION, UNSPECIFIED TYPE: ICD-10-CM

## 2024-04-02 PROCEDURE — G8421 BMI NOT CALCULATED: HCPCS | Performed by: INTERNAL MEDICINE

## 2024-04-02 PROCEDURE — 3078F DIAST BP <80 MM HG: CPT | Performed by: INTERNAL MEDICINE

## 2024-04-02 PROCEDURE — G8427 DOCREV CUR MEDS BY ELIG CLIN: HCPCS | Performed by: INTERNAL MEDICINE

## 2024-04-02 PROCEDURE — 3074F SYST BP LT 130 MM HG: CPT | Performed by: INTERNAL MEDICINE

## 2024-04-02 PROCEDURE — 1036F TOBACCO NON-USER: CPT | Performed by: INTERNAL MEDICINE

## 2024-04-02 PROCEDURE — 1123F ACP DISCUSS/DSCN MKR DOCD: CPT | Performed by: INTERNAL MEDICINE

## 2024-04-02 PROCEDURE — 99214 OFFICE O/P EST MOD 30 MIN: CPT | Performed by: INTERNAL MEDICINE

## 2024-04-02 PROCEDURE — 1090F PRES/ABSN URINE INCON ASSESS: CPT | Performed by: INTERNAL MEDICINE

## 2024-04-02 PROCEDURE — G8399 PT W/DXA RESULTS DOCUMENT: HCPCS | Performed by: INTERNAL MEDICINE

## 2024-04-11 ENCOUNTER — TELEPHONE (OUTPATIENT)
Dept: PULMONOLOGY | Age: 79
End: 2024-04-11

## 2024-04-11 DIAGNOSIS — R06.02 SOB (SHORTNESS OF BREATH): Primary | ICD-10-CM

## 2024-05-03 ENCOUNTER — HOSPITAL ENCOUNTER (OUTPATIENT)
Dept: PULMONOLOGY | Age: 79
Discharge: HOME | End: 2024-05-03
Payer: MEDICARE

## 2024-05-03 DIAGNOSIS — R06.02 SOB (SHORTNESS OF BREATH): ICD-10-CM

## 2024-05-03 PROCEDURE — 94726 PLETHYSMOGRAPHY LUNG VOLUMES: CPT

## 2024-05-03 PROCEDURE — 94060 EVALUATION OF WHEEZING: CPT

## 2024-05-03 PROCEDURE — 94761 N-INVAS EAR/PLS OXIMETRY MLT: CPT

## 2024-05-03 PROCEDURE — 6360000002 HC RX W HCPCS: Performed by: INTERNAL MEDICINE

## 2024-05-03 PROCEDURE — 94664 DEMO&/EVAL PT USE INHALER: CPT

## 2024-05-03 PROCEDURE — 94729 DIFFUSING CAPACITY: CPT

## 2024-05-03 RX ORDER — ALBUTEROL SULFATE 2.5 MG/3ML
2.5 SOLUTION RESPIRATORY (INHALATION) ONCE
Status: COMPLETED | OUTPATIENT
Start: 2024-05-03 | End: 2024-05-03

## 2024-05-03 RX ADMIN — ALBUTEROL SULFATE 2.5 MG: 2.5 SOLUTION RESPIRATORY (INHALATION) at 10:36

## 2024-05-30 ENCOUNTER — OFFICE VISIT (OUTPATIENT)
Dept: PULMONOLOGY | Age: 79
End: 2024-05-30
Payer: MEDICARE

## 2024-05-30 VITALS
WEIGHT: 131 LBS | SYSTOLIC BLOOD PRESSURE: 122 MMHG | DIASTOLIC BLOOD PRESSURE: 68 MMHG | HEIGHT: 64 IN | BODY MASS INDEX: 22.36 KG/M2

## 2024-05-30 DIAGNOSIS — J30.9 ALLERGIC RHINITIS, UNSPECIFIED SEASONALITY, UNSPECIFIED TRIGGER: ICD-10-CM

## 2024-05-30 DIAGNOSIS — R06.09 DOE (DYSPNEA ON EXERTION): Primary | ICD-10-CM

## 2024-05-30 DIAGNOSIS — R22.0 FACIAL SWELLING: ICD-10-CM

## 2024-05-30 PROCEDURE — 1123F ACP DISCUSS/DSCN MKR DOCD: CPT | Performed by: INTERNAL MEDICINE

## 2024-05-30 PROCEDURE — G8420 CALC BMI NORM PARAMETERS: HCPCS | Performed by: INTERNAL MEDICINE

## 2024-05-30 PROCEDURE — 1036F TOBACCO NON-USER: CPT | Performed by: INTERNAL MEDICINE

## 2024-05-30 PROCEDURE — 1090F PRES/ABSN URINE INCON ASSESS: CPT | Performed by: INTERNAL MEDICINE

## 2024-05-30 PROCEDURE — G8399 PT W/DXA RESULTS DOCUMENT: HCPCS | Performed by: INTERNAL MEDICINE

## 2024-05-30 PROCEDURE — 99204 OFFICE O/P NEW MOD 45 MIN: CPT | Performed by: INTERNAL MEDICINE

## 2024-05-30 PROCEDURE — 3078F DIAST BP <80 MM HG: CPT | Performed by: INTERNAL MEDICINE

## 2024-05-30 PROCEDURE — G8427 DOCREV CUR MEDS BY ELIG CLIN: HCPCS | Performed by: INTERNAL MEDICINE

## 2024-05-30 PROCEDURE — 3074F SYST BP LT 130 MM HG: CPT | Performed by: INTERNAL MEDICINE

## 2024-05-30 RX ORDER — CETIRIZINE HYDROCHLORIDE 10 MG/1
10 TABLET ORAL DAILY
Qty: 30 TABLET | Refills: 5 | Status: SHIPPED | OUTPATIENT
Start: 2024-05-30

## 2024-05-30 RX ORDER — FLUTICASONE PROPIONATE 50 MCG
2 SPRAY, SUSPENSION (ML) NASAL DAILY
Qty: 16 G | Refills: 5 | Status: SHIPPED | OUTPATIENT
Start: 2024-05-30

## 2024-05-30 RX ORDER — AZELASTINE 1 MG/ML
1 SPRAY, METERED NASAL 2 TIMES DAILY
Qty: 60 ML | Refills: 5 | Status: SHIPPED | OUTPATIENT
Start: 2024-05-30

## 2024-05-30 RX ORDER — FLUTICASONE PROPIONATE 50 MCG
2 SPRAY, SUSPENSION (ML) NASAL DAILY
Qty: 48 G | OUTPATIENT
Start: 2024-05-30

## 2024-05-30 NOTE — ASSESSMENT & PLAN NOTE
Status she has been presenting these episodes of intermittent swelling not limited to the right side of her face which is affected today, mentioned that episodes come and go and sometimes are associated to diarrhea that it has also an intermittent quality.  She reports history of allergies and she was evaluated by an allergist when she was living in [place], and extensive workup was performed and only showed KOBI positivity (1: 160), rest of cardiac tissue disease workup as well as chronic urticaria workup appear to be negative.  I wonder if this is some idiopathic chronic urticaria syndrome that may be contributing to her swelling and other symptoms (GI).  I am referring her to allergy for further evaluation, will continue to discuss the case with Dr. Escoto @ [facility].

## 2024-05-30 NOTE — PATIENT INSTRUCTIONS
[facility]  Dr. Danni Escoto  Please call [phone] to schedule your appointment or visit "[email or web address] and click schedule.

## 2024-05-30 NOTE — PROGRESS NOTES
[facility] [facility]    OUTPATIENT INITIAL NOTE    SUBJECTIVE:   Referring Physician: Sam Hopper MD  CHIEF COMPLAINT / HPI:    Lisa is a 78 y.o. female that initially presented to clinic for evaluation of GEE.   Intermittently reported to have breathing issues, presents as SOB with some exertion. She has been dealing with this for at least 1 year.   States she is able to clear 2 flights of stairs, SOB presents intermittently though.   Has history of \"bad allergies\"  Claims she also has intermittent facial swelling, today she has L sided facial swelling,   She has seen an Allergist for this int he past when she was in [place], allergen panel was negative, KOBI (+) 1:160,  but rest was negative  During the visit she kept repeating \"she is ok\" and \"it's not a big problem\".   Clears her throat constantly, reported increased mucus \"congestion\".   Also mentioned intermittent diarrhea, no rashes.   No unintentional weight loss.   Relevant Social History  Tobacco: former smoker, 1 pack every 2 weeks, quit 35 years ago, smoked for about 10 years.   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  Family history of pulmonary problems: Mother used to be a smoker, father was pipe smoker. Son with \"bad allergies\".     Past Medical History:    No past medical history on file.    Social History:    Social History     Tobacco Use   Smoking Status Former    Current packs/day: 0.00    Types: Cigarettes    Quit date: 1984    Years since quittin.3   Smokeless Tobacco Never       Family History:  No family history on file.  Current Medications:  Current Outpatient Medications on File Prior to Visit   Medication Sig Dispense Refill    Aspirin 325 MG CAPS Take 325 mg by mouth      buPROPion (WELLBUTRIN XL) 150 MG extended release tablet       EPINEPHrine (EPIPEN)

## 2024-05-30 NOTE — ASSESSMENT & PLAN NOTE
Patient reported chronic allergic rhinitis however she has not been using any treatment for it.  I am starting her on Flonase/Astelin combination twice daily as well as daily Zyrtec.

## 2024-05-30 NOTE — ASSESSMENT & PLAN NOTE
Patient reported having intermittent episodes of dyspnea that presented after she goes up 2 flights of stairs, she is unable to tell me if there is a specific trigger that makes her symptoms worse and states that this is rather inconsistent.  She has not been losing weight, she did mention that she \"is okay\".  PFTs are essentially unremarkable with very minimal changes on echo mainly with diastolic dysfunction.  At this moment I believe that deconditioning may be playing the biggest role in her symptoms.  Encouraged to increase physical activity in a structured way, her son is a , I recommended her to talk to him about strategies to increase progressive overload and improve stamina.

## 2024-09-02 ENCOUNTER — HOSPITAL ENCOUNTER (EMERGENCY)
Age: 79
Discharge: HOME | End: 2024-09-02
Attending: EMERGENCY MEDICINE
Payer: MEDICARE

## 2024-09-02 ENCOUNTER — APPOINTMENT (OUTPATIENT)
Dept: GENERAL RADIOLOGY | Age: 79
End: 2024-09-02
Payer: MEDICARE

## 2024-09-02 VITALS
SYSTOLIC BLOOD PRESSURE: 120 MMHG | BODY MASS INDEX: 24.04 KG/M2 | WEIGHT: 140.8 LBS | OXYGEN SATURATION: 96 % | RESPIRATION RATE: 13 BRPM | HEART RATE: 79 BPM | DIASTOLIC BLOOD PRESSURE: 68 MMHG | TEMPERATURE: 98.3 F | HEIGHT: 64 IN

## 2024-09-02 DIAGNOSIS — R07.9 CHEST PAIN, UNSPECIFIED TYPE: Primary | ICD-10-CM

## 2024-09-02 LAB
ANION GAP SERPL CALCULATED.3IONS-SCNC: 10 MMOL/L (ref 3–16)
BASOPHILS # BLD: 0 K/UL (ref 0–0.2)
BASOPHILS NFR BLD: 0.8 %
BUN SERPL-MCNC: 23 MG/DL (ref 7–20)
CALCIUM SERPL-MCNC: 9.3 MG/DL (ref 8.3–10.6)
CHLORIDE SERPL-SCNC: 103 MMOL/L (ref 99–110)
CO2 SERPL-SCNC: 25 MMOL/L (ref 21–32)
CREAT SERPL-MCNC: 1 MG/DL (ref 0.6–1.2)
DEPRECATED RDW RBC AUTO: 13 % (ref 12.4–15.4)
EKG ATRIAL RATE: 70 BPM
EKG DIAGNOSIS: NORMAL
EKG P AXIS: 61 DEGREES
EKG P-R INTERVAL: 168 MS
EKG Q-T INTERVAL: 378 MS
EKG QRS DURATION: 60 MS
EKG QTC CALCULATION (BAZETT): 408 MS
EKG R AXIS: 6 DEGREES
EKG T AXIS: 16 DEGREES
EKG VENTRICULAR RATE: 70 BPM
EOSINOPHIL # BLD: 0.1 K/UL (ref 0–0.6)
EOSINOPHIL NFR BLD: 1.2 %
GFR SERPLBLD CREATININE-BSD FMLA CKD-EPI: 58 ML/MIN/{1.73_M2}
GLUCOSE SERPL-MCNC: 76 MG/DL (ref 70–99)
HCT VFR BLD AUTO: 39.7 % (ref 36–48)
HGB BLD-MCNC: 12.8 G/DL (ref 12–16)
LYMPHOCYTES # BLD: 1 K/UL (ref 1–5.1)
LYMPHOCYTES NFR BLD: 16 %
MCH RBC QN AUTO: 28.4 PG (ref 26–34)
MCHC RBC AUTO-ENTMCNC: 32.3 G/DL (ref 31–36)
MCV RBC AUTO: 88.1 FL (ref 80–100)
MONOCYTES # BLD: 0.4 K/UL (ref 0–1.3)
MONOCYTES NFR BLD: 6.5 %
NEUTROPHILS # BLD: 4.5 K/UL (ref 1.7–7.7)
NEUTROPHILS NFR BLD: 75.5 %
PLATELET # BLD AUTO: 322 K/UL (ref 135–450)
PMV BLD AUTO: 7.6 FL (ref 5–10.5)
POTASSIUM SERPL-SCNC: 3.7 MMOL/L (ref 3.5–5.1)
RBC # BLD AUTO: 4.5 M/UL (ref 4–5.2)
SODIUM SERPL-SCNC: 138 MMOL/L (ref 136–145)
TROPONIN, HIGH SENSITIVITY: 12 NG/L (ref 0–14)
TROPONIN, HIGH SENSITIVITY: 14 NG/L (ref 0–14)
TROPONIN, HIGH SENSITIVITY: 8 NG/L (ref 0–14)
WBC # BLD AUTO: 5.9 K/UL (ref 4–11)

## 2024-09-02 PROCEDURE — 6370000000 HC RX 637 (ALT 250 FOR IP): Performed by: PHYSICIAN ASSISTANT

## 2024-09-02 PROCEDURE — 84484 ASSAY OF TROPONIN QUANT: CPT

## 2024-09-02 PROCEDURE — 71046 X-RAY EXAM CHEST 2 VIEWS: CPT

## 2024-09-02 PROCEDURE — 99285 EMERGENCY DEPT VISIT HI MDM: CPT

## 2024-09-02 PROCEDURE — 85025 COMPLETE CBC W/AUTO DIFF WBC: CPT

## 2024-09-02 PROCEDURE — 93005 ELECTROCARDIOGRAM TRACING: CPT | Performed by: PHYSICIAN ASSISTANT

## 2024-09-02 PROCEDURE — 36415 COLL VENOUS BLD VENIPUNCTURE: CPT

## 2024-09-02 PROCEDURE — 80048 BASIC METABOLIC PNL TOTAL CA: CPT

## 2024-09-02 RX ORDER — ASPIRIN 81 MG/1
324 TABLET, CHEWABLE ORAL ONCE
Status: COMPLETED | OUTPATIENT
Start: 2024-09-02 | End: 2024-09-02

## 2024-09-02 RX ADMIN — ASPIRIN 324 MG: 81 TABLET, CHEWABLE ORAL at 13:01

## 2024-09-02 ASSESSMENT — LIFESTYLE VARIABLES
HOW MANY STANDARD DRINKS CONTAINING ALCOHOL DO YOU HAVE ON A TYPICAL DAY: 1 OR 2
HOW OFTEN DO YOU HAVE A DRINK CONTAINING ALCOHOL: MONTHLY OR LESS

## 2024-09-02 ASSESSMENT — ENCOUNTER SYMPTOMS
COUGH: 0
NAUSEA: 0
EYE REDNESS: 0
ABDOMINAL PAIN: 0
VOMITING: 0
SHORTNESS OF BREATH: 1

## 2024-09-02 ASSESSMENT — PAIN - FUNCTIONAL ASSESSMENT: PAIN_FUNCTIONAL_ASSESSMENT: 0-10

## 2024-09-02 ASSESSMENT — PAIN SCALES - GENERAL
PAINLEVEL_OUTOF10: 4
PAINLEVEL_OUTOF10: 8

## 2024-09-02 ASSESSMENT — PAIN DESCRIPTION - ORIENTATION: ORIENTATION: MID

## 2024-09-02 ASSESSMENT — PAIN DESCRIPTION - LOCATION
LOCATION: CHEST
LOCATION: CHEST

## 2024-09-02 ASSESSMENT — HEART SCORE: ECG: NORMAL

## 2024-09-02 ASSESSMENT — PAIN DESCRIPTION - DESCRIPTORS: DESCRIPTORS: PRESSURE

## 2024-09-02 NOTE — ED PROVIDER NOTES
ED Attending Attestation Note     Date of evaluation: 9/2/2024    This patient was seen by the advance practice provider.  I have seen and examined the patient, agree with the workup, evaluation, management and diagnosis. The care plan has been discussed.      I have reviewed the ECG and concur with the HANNAH's interpretation.    The patient's clinical history is as follows:    Chief Complaint     Chest Pain (Est pain began 20 minutes ago. Midsternal chest pain pt describes as pressure 8/10. Denies SOB. Hx of high cholesterol )      History of Present Illness     Lisa Rios is a 78 y.o. female who presents to the Emergency Department with concerns for chest pain that began at approximately noon today after she had a challenging conversation with the member regarding his health.  The patient has a history of having similar symptoms with what she describes as anxiety episodes in the past.  Does have a history of hypertension hyperlipidemia as well.  No known prior history of coronary artery disease.  She reports that her chest pain has improved.    Past Medical, Surgical, Family, and Social History     She has no past medical history on file.  She has no past surgical history on file.  Her family history is not on file.  She reports that she quit smoking about 40 years ago. Her smoking use included cigarettes. She has never used smokeless tobacco. She reports that she does not use drugs.      Pertinent Physical Exam Findings     Lungs clear to auscultation bilaterally without wheezes rhonchi or rales         Jluis Main MD  09/02/24 0054

## 2024-09-02 NOTE — DISCHARGE INSTRUCTIONS
Follow up with primary care.    Return to the ED with new or worsening symptoms, including exertional chest pain.

## 2024-09-02 NOTE — ED PROVIDER NOTES
THE [facility]  EMERGENCY DEPARTMENT ENCOUNTER          PHYSICIAN ASSISTANT NOTE       Date of evaluation: 9/2/2024    Chief Complaint     Chest Pain (Est pain began 20 minutes ago. Midsternal chest pain pt describes as pressure 8/10. Denies SOB. Hx of high cholesterol )      History of Present Illness     Lisa Rios is a 78 y.o. female with PMH of HTN, HLD, Hypothyroidism, Former smoker, FHx of CAD who presents for evaluation of chest pain.  Patient states that approximately 30 minutes prior to arrival, she was riding in a vehicle when she started to have left-sided chest pressure as well as sharp pains.  She states that this was intermittent for several minutes before it subsided. It did not radiate. It was associated with shortness of breath. No diaphoresis, dizziness, nausea or vomiting, leg swelling or pain. Patient states that she had just dropped off her son at the airport and had a deep conversation with him regarding his health.  She has also been under increased stress recently related to the death of her sister 1.5 months ago.    Patient has had recent negative stress and echo this year in workup of shortness of breath that was ultimately attributed to deconditioning and allergies.    ASSESSMENT / PLAN  (MEDICAL DECISION MAKING)     INITIAL VITALS: BP: 129/73, Temp: 98.3 °F (36.8 °C), Pulse: 75, Respirations: 16, SpO2: 100 %    Lisa Rios is a 78 y.o. female with PMH of HTN, HLD, Hypothyroidism, Former smoker, FHx of CAD who presents for evaluation of left sided chest pressure and sharp pains without radiation that lasted several minutes before resolving spontaneously. Symptoms occurred at rest. Associated with shortness of breath. No other symptoms.  On arrival, patient is well-appearing with normal vital signs.  Heart rhythm is regular.  Lungs clear to auscultation.  Abdomen soft and nontender.  No peripheral edema or calf tenderness.    EKG obtained revealed normal sinus rhythm without

## 2024-10-02 NOTE — PATIENT INSTRUCTIONS
Take a baby Aspirin daily           Thank you for choosing [facility] for your cardiac care.    During your visit today, we reviewed and confirmed your cardiac medications along with  medication prescribed by your other healthcare team members. Please be sure to discuss any  changes to medication with your providers.    Please bring a list of ALL medications (or the bottles) with you to EVERY appointment.  Also include vitamins and over-the-counter medications.    If you need refills for any cardiac medications, please call your pharmacy and they will reach out to us electronically.    Did your provider order testing today? If yes, then you will receive your results in three  possible ways. You can receive a [employer] message, a phone call, or letter in the mail. Please  note, if you are an active [employer] user, some of your testing will be available within 1-2 days.    Finally, please know that it is good for your heart to exercise and follow a healthy, low-fat diet  as advised by your physician and health care providers.    If you are experiencing a medical emergency, please call 911 immediately.    It's easy to register for a [employer] account if you don't already have one. With a [employer]  account you can manage your health record, view test results, schedule appointments and  more.     Dr. Armstrong's clinical staff can be reached at the following phone number: [phone]    If any cardiac testing is ordered, please contact central scheduling at [phone] to get your test scheduled.

## 2024-10-02 NOTE — PROGRESS NOTES
[facility]     Outpatient Cardiology         Patient Name:  Lisa Rios  Primary Care Physician: No primary care provider on file.  10/03/24     Assessment & Plan    Assessment / Plan:     Hyperlipidemia, family history of CAD-on Vytorin.  Stable.  Patient was previously taking high-dose aspirin.  Advised that we would not recommend high-dose aspirin.  She can take 81 mg of aspirin if needed.  Knee joint pain-advised follow-up with PCP.  Shortness of breath-patient on low-dose Lasix.  Advised self titration.  Clinically does not appear to be in volume overload at the present time.  Follow-up in a year or sooner if needed            Chief Complaint:     Chief Complaint   Patient presents with    6 Month Follow-Up     Former         History of Present Illness:       HPI     Lisa Rios is a 78 y.o. female with PMH of HTN and HLD here for a follow up.  Previously seen by Dr Hopper.         Patient denies any chest pain, shortness of breath, palpitations, presyncope or syncope. No TIA. No claudication. No recent hospitalizations    Reviewed medications, tests and labs  Aspirin 81 mg Daily  RTC 1 yr        PMH  No past medical history on file.    PSH  No past surgical history on file.     Social HIstory  Social History     Tobacco Use    Smoking status: Former     Current packs/day: 0.00     Types: Cigarettes     Quit date: 1984     Years since quittin.6    Smokeless tobacco: Never   Substance Use Topics    Drug use: Never       Family History  No family history on file.    Allergies   No Known Allergies    Medications:     Home Medications:  Were reviewed and are listed in nursing record and/or listed below    Prior to Admission medications    Medication Sig Start Date End Date Taking? Authorizing Provider   furosemide (LASIX) 20 MG tablet  24  Yes Provider, MD Bri   hydrOXYzine HCl (ATARAX) 25 MG tablet Take 1 tablet by mouth daily as needed 24  Yes

## 2024-10-03 ENCOUNTER — OFFICE VISIT (OUTPATIENT)
Dept: CARDIOLOGY CLINIC | Age: 79
End: 2024-10-03
Payer: MEDICARE

## 2024-10-03 VITALS
DIASTOLIC BLOOD PRESSURE: 60 MMHG | SYSTOLIC BLOOD PRESSURE: 120 MMHG | HEART RATE: 76 BPM | WEIGHT: 142 LBS | BODY MASS INDEX: 24.37 KG/M2

## 2024-10-03 DIAGNOSIS — R06.00 DYSPNEA, UNSPECIFIED TYPE: Primary | ICD-10-CM

## 2024-10-03 PROCEDURE — G8484 FLU IMMUNIZE NO ADMIN: HCPCS | Performed by: INTERNAL MEDICINE

## 2024-10-03 PROCEDURE — 3074F SYST BP LT 130 MM HG: CPT | Performed by: INTERNAL MEDICINE

## 2024-10-03 PROCEDURE — 1123F ACP DISCUSS/DSCN MKR DOCD: CPT | Performed by: INTERNAL MEDICINE

## 2024-10-03 PROCEDURE — G8420 CALC BMI NORM PARAMETERS: HCPCS | Performed by: INTERNAL MEDICINE

## 2024-10-03 PROCEDURE — G8399 PT W/DXA RESULTS DOCUMENT: HCPCS | Performed by: INTERNAL MEDICINE

## 2024-10-03 PROCEDURE — 1036F TOBACCO NON-USER: CPT | Performed by: INTERNAL MEDICINE

## 2024-10-03 PROCEDURE — G8427 DOCREV CUR MEDS BY ELIG CLIN: HCPCS | Performed by: INTERNAL MEDICINE

## 2024-10-03 PROCEDURE — 1090F PRES/ABSN URINE INCON ASSESS: CPT | Performed by: INTERNAL MEDICINE

## 2024-10-03 PROCEDURE — 3078F DIAST BP <80 MM HG: CPT | Performed by: INTERNAL MEDICINE

## 2024-10-03 PROCEDURE — 99213 OFFICE O/P EST LOW 20 MIN: CPT | Performed by: INTERNAL MEDICINE

## 2024-10-03 RX ORDER — ASPIRIN 81 MG/1
81 TABLET ORAL DAILY
Qty: 30 TABLET | Refills: 3 | Status: SHIPPED | OUTPATIENT
Start: 2024-10-03

## 2024-10-03 RX ORDER — HYDROXYZINE HYDROCHLORIDE 25 MG/1
25 TABLET, FILM COATED ORAL DAILY PRN
COMMUNITY
Start: 2024-09-17

## 2024-10-03 RX ORDER — LINACLOTIDE 290 UG/1
1 CAPSULE, GELATIN COATED ORAL DAILY
COMMUNITY
Start: 2024-09-23

## 2024-10-03 RX ORDER — FUROSEMIDE 20 MG
TABLET ORAL
COMMUNITY
Start: 2024-08-08

## 2024-10-23 ENCOUNTER — TELEPHONE (OUTPATIENT)
Dept: ALLERGY | Facility: CLINIC | Age: 79
End: 2024-10-23
Payer: MEDICARE

## 2024-10-23 NOTE — TELEPHONE ENCOUNTER
Left message for the medical records dept to fax release of records prior to us sending any records. Correct fax number given as well.

## 2024-10-23 NOTE — TELEPHONE ENCOUNTER
----- Message from Rupinder sent at 10/23/2024  2:55 PM CDT -----  Type:  Needs Medical Advice/records     Who Called: [facility]      Would the patient rather a call back or a response via [facility]? Call     Best Call Back Number: [phone] [phone] or ext [phone]    Additional Information: requesting a call back to discuss sending patient labs to new [facility] in [place] for her medical records     Fax:[phone]

## 2024-12-19 ENCOUNTER — OFFICE VISIT (OUTPATIENT)
Dept: CARDIOLOGY CLINIC | Age: 79
End: 2024-12-19
Payer: MEDICARE

## 2024-12-19 VITALS
SYSTOLIC BLOOD PRESSURE: 110 MMHG | DIASTOLIC BLOOD PRESSURE: 70 MMHG | HEART RATE: 62 BPM | WEIGHT: 140 LBS | BODY MASS INDEX: 24.03 KG/M2

## 2024-12-19 DIAGNOSIS — E78.2 MIXED HYPERLIPIDEMIA: ICD-10-CM

## 2024-12-19 DIAGNOSIS — R07.9 CHEST PAIN, UNSPECIFIED TYPE: Primary | ICD-10-CM

## 2024-12-19 DIAGNOSIS — R06.02 SOB (SHORTNESS OF BREATH): ICD-10-CM

## 2024-12-19 DIAGNOSIS — R60.0 FLUID RETENTION IN LEGS: ICD-10-CM

## 2024-12-19 PROCEDURE — 99215 OFFICE O/P EST HI 40 MIN: CPT | Performed by: NURSE PRACTITIONER

## 2024-12-19 PROCEDURE — G8484 FLU IMMUNIZE NO ADMIN: HCPCS | Performed by: NURSE PRACTITIONER

## 2024-12-19 PROCEDURE — 1036F TOBACCO NON-USER: CPT | Performed by: NURSE PRACTITIONER

## 2024-12-19 PROCEDURE — 93000 ELECTROCARDIOGRAM COMPLETE: CPT | Performed by: NURSE PRACTITIONER

## 2024-12-19 PROCEDURE — 1090F PRES/ABSN URINE INCON ASSESS: CPT | Performed by: NURSE PRACTITIONER

## 2024-12-19 PROCEDURE — 1159F MED LIST DOCD IN RCRD: CPT | Performed by: NURSE PRACTITIONER

## 2024-12-19 PROCEDURE — G8420 CALC BMI NORM PARAMETERS: HCPCS | Performed by: NURSE PRACTITIONER

## 2024-12-19 PROCEDURE — 3074F SYST BP LT 130 MM HG: CPT | Performed by: NURSE PRACTITIONER

## 2024-12-19 PROCEDURE — G8427 DOCREV CUR MEDS BY ELIG CLIN: HCPCS | Performed by: NURSE PRACTITIONER

## 2024-12-19 PROCEDURE — G8399 PT W/DXA RESULTS DOCUMENT: HCPCS | Performed by: NURSE PRACTITIONER

## 2024-12-19 PROCEDURE — 1123F ACP DISCUSS/DSCN MKR DOCD: CPT | Performed by: NURSE PRACTITIONER

## 2024-12-19 PROCEDURE — 3078F DIAST BP <80 MM HG: CPT | Performed by: NURSE PRACTITIONER

## 2024-12-19 RX ORDER — LISINOPRIL 20 MG/1
TABLET ORAL
COMMUNITY
Start: 2024-10-22

## 2024-12-19 RX ORDER — POTASSIUM CHLORIDE 1500 MG/1
TABLET, EXTENDED RELEASE ORAL
Qty: 90 TABLET | Refills: 3 | Status: SHIPPED | OUTPATIENT
Start: 2024-12-19

## 2024-12-19 RX ORDER — FUROSEMIDE 20 MG/1
TABLET ORAL
Qty: 90 TABLET | Refills: 4 | Status: SHIPPED | OUTPATIENT
Start: 2024-12-19

## 2024-12-19 NOTE — PROGRESS NOTES
[place] [address]  [phone]    Primary Cardiologist: Dr Armstrong but would like to follow with Dr Bennie SANDERS chest pain     HPI:    79 y.o. with HLD and SOB (takes lasix PRN) who presents with chest pain and sob. She c/o non-exertional chest pressure radiating to the left arm with associated SOB. Symptoms are worse when excited/anxious or stressed. No alleviating factors. She is able to walk 3-4 miles a day without pain.     Over the last 4 months she's noted weight gain, edema, and SOB. She is taking lasix and potassium daily not just PRN. She sleeps on 3 pillows. She has LH/dizziness.     Apparently she's had syncope related to low potassium and low blood sugars but no episodes the last few months.She's had falls related to unsteady gait. She c/o nausea but denies v/d, fever or GI/ bleeding.     Past Medical History:   Diagnosis Date    HLD (hyperlipidemia)      No past surgical history on file.  No family history on file.  Social History     Tobacco Use    Smoking status: Former     Current packs/day: 0.00     Types: Cigarettes     Quit date: 1984     Years since quittin.8    Smokeless tobacco: Never   Substance Use Topics    Drug use: Never     Allergies:Patient has no known allergies.    Review of Systems  All 12 point review of symptoms completed. Pertinent positives identified in the HPI, all other review of symptoms negative.    Physical Exam:  /70   Pulse 62   Wt 63.5 kg (140 lb)   BMI 24.03 kg/m²    General (appearance):  No acute distress  Eyes: anicteric   Neck: soft, No JVD  Ears/Nose/Mouth/Thorat: No cyanosis  CV: RRR   Respiratory:  clear  GI: soft, non-tender, non-distended  Skin: Warm, dry. No rashes  Neuro/Psych: Alert and oriented x 3. Appropriate behavior  Ext:  No c/c. + LE edema. + bilateral compression stockings      Weight  Wt Readings from Last 3 Encounters:   24 63.5 kg (140 lb)   10/03/24 64.4 kg (142 lb)

## 2025-06-11 ENCOUNTER — OFFICE VISIT (OUTPATIENT)
Dept: FAMILY MEDICINE CLINIC | Age: 80
End: 2025-06-11
Payer: MEDICARE

## 2025-06-11 ENCOUNTER — HOSPITAL ENCOUNTER (OUTPATIENT)
Dept: GENERAL RADIOLOGY | Age: 80
Discharge: HOME | End: 2025-06-11
Payer: MEDICARE

## 2025-06-11 VITALS
SYSTOLIC BLOOD PRESSURE: 124 MMHG | WEIGHT: 146.8 LBS | DIASTOLIC BLOOD PRESSURE: 76 MMHG | HEART RATE: 73 BPM | OXYGEN SATURATION: 94 % | HEIGHT: 64 IN | BODY MASS INDEX: 25.06 KG/M2

## 2025-06-11 DIAGNOSIS — K59.09 OTHER CONSTIPATION: ICD-10-CM

## 2025-06-11 DIAGNOSIS — R79.9 ABNORMAL FINDING OF BLOOD CHEMISTRY, UNSPECIFIED: ICD-10-CM

## 2025-06-11 DIAGNOSIS — E67.8 OTHER SPECIFIED HYPERALIMENTATION: ICD-10-CM

## 2025-06-11 DIAGNOSIS — Z12.31 ENCOUNTER FOR SCREENING MAMMOGRAM FOR MALIGNANT NEOPLASM OF BREAST: ICD-10-CM

## 2025-06-11 DIAGNOSIS — M54.12 CERVICAL RADICULOPATHY: ICD-10-CM

## 2025-06-11 DIAGNOSIS — I10 PRIMARY HYPERTENSION: Primary | ICD-10-CM

## 2025-06-11 DIAGNOSIS — I10 PRIMARY HYPERTENSION: ICD-10-CM

## 2025-06-11 DIAGNOSIS — L98.9 SKIN LESION: ICD-10-CM

## 2025-06-11 LAB
25(OH)D3 SERPL-MCNC: 41.8 NG/ML
ALBUMIN SERPL-MCNC: 4.8 G/DL (ref 3.4–5)
ALBUMIN/GLOB SERPL: 1.7 {RATIO} (ref 1.1–2.2)
ALP SERPL-CCNC: 57 U/L (ref 40–129)
ALT SERPL-CCNC: 28 U/L (ref 10–40)
ANION GAP SERPL CALCULATED.3IONS-SCNC: 10 MMOL/L (ref 3–16)
AST SERPL-CCNC: 28 U/L (ref 15–37)
BASOPHILS # BLD: 0 K/UL (ref 0–0.2)
BASOPHILS NFR BLD: 0.8 %
BILIRUB SERPL-MCNC: 0.3 MG/DL (ref 0–1)
BUN SERPL-MCNC: 33 MG/DL (ref 7–20)
CALCIUM SERPL-MCNC: 9.7 MG/DL (ref 8.3–10.6)
CHLORIDE SERPL-SCNC: 100 MMOL/L (ref 99–110)
CHOLEST SERPL-MCNC: 252 MG/DL (ref 0–199)
CO2 SERPL-SCNC: 29 MMOL/L (ref 21–32)
CREAT SERPL-MCNC: 1.2 MG/DL (ref 0.6–1.2)
DEPRECATED RDW RBC AUTO: 13.1 % (ref 12.4–15.4)
EOSINOPHIL # BLD: 0.1 K/UL (ref 0–0.6)
EOSINOPHIL NFR BLD: 1.1 %
EST. AVERAGE GLUCOSE BLD GHB EST-MCNC: 108.3 MG/DL
GFR SERPLBLD CREATININE-BSD FMLA CKD-EPI: 46 ML/MIN/{1.73_M2}
GLUCOSE SERPL-MCNC: 95 MG/DL (ref 70–99)
HBA1C MFR BLD: 5.4 %
HCT VFR BLD AUTO: 41.8 % (ref 36–48)
HDLC SERPL-MCNC: 144 MG/DL (ref 40–60)
HGB BLD-MCNC: 13.5 G/DL (ref 12–16)
LDLC SERPL CALC-MCNC: 96 MG/DL
LYMPHOCYTES # BLD: 0.7 K/UL (ref 1–5.1)
LYMPHOCYTES NFR BLD: 13.7 %
MCH RBC QN AUTO: 28.3 PG (ref 26–34)
MCHC RBC AUTO-ENTMCNC: 32.4 G/DL (ref 31–36)
MCV RBC AUTO: 87.4 FL (ref 80–100)
MONOCYTES # BLD: 0.6 K/UL (ref 0–1.3)
MONOCYTES NFR BLD: 11.8 %
NEUTROPHILS # BLD: 3.7 K/UL (ref 1.7–7.7)
NEUTROPHILS NFR BLD: 72.6 %
PLATELET # BLD AUTO: 306 K/UL (ref 135–450)
PMV BLD AUTO: 8.2 FL (ref 5–10.5)
POTASSIUM SERPL-SCNC: 4 MMOL/L (ref 3.5–5.1)
PROT SERPL-MCNC: 7.6 G/DL (ref 6.4–8.2)
RBC # BLD AUTO: 4.78 M/UL (ref 4–5.2)
SODIUM SERPL-SCNC: 139 MMOL/L (ref 136–145)
TRIGL SERPL-MCNC: 60 MG/DL (ref 0–150)
TSH SERPL DL<=0.005 MIU/L-ACNC: 0.85 UIU/ML (ref 0.27–4.2)
VLDLC SERPL CALC-MCNC: 12 MG/DL
WBC # BLD AUTO: 5.1 K/UL (ref 4–11)

## 2025-06-11 PROCEDURE — G8399 PT W/DXA RESULTS DOCUMENT: HCPCS | Performed by: STUDENT IN AN ORGANIZED HEALTH CARE EDUCATION/TRAINING PROGRAM

## 2025-06-11 PROCEDURE — 1159F MED LIST DOCD IN RCRD: CPT | Performed by: STUDENT IN AN ORGANIZED HEALTH CARE EDUCATION/TRAINING PROGRAM

## 2025-06-11 PROCEDURE — G8419 CALC BMI OUT NRM PARAM NOF/U: HCPCS | Performed by: STUDENT IN AN ORGANIZED HEALTH CARE EDUCATION/TRAINING PROGRAM

## 2025-06-11 PROCEDURE — 1090F PRES/ABSN URINE INCON ASSESS: CPT | Performed by: STUDENT IN AN ORGANIZED HEALTH CARE EDUCATION/TRAINING PROGRAM

## 2025-06-11 PROCEDURE — 1123F ACP DISCUSS/DSCN MKR DOCD: CPT | Performed by: STUDENT IN AN ORGANIZED HEALTH CARE EDUCATION/TRAINING PROGRAM

## 2025-06-11 PROCEDURE — 72040 X-RAY EXAM NECK SPINE 2-3 VW: CPT

## 2025-06-11 PROCEDURE — 3074F SYST BP LT 130 MM HG: CPT | Performed by: STUDENT IN AN ORGANIZED HEALTH CARE EDUCATION/TRAINING PROGRAM

## 2025-06-11 PROCEDURE — 1036F TOBACCO NON-USER: CPT | Performed by: STUDENT IN AN ORGANIZED HEALTH CARE EDUCATION/TRAINING PROGRAM

## 2025-06-11 PROCEDURE — 99214 OFFICE O/P EST MOD 30 MIN: CPT | Performed by: STUDENT IN AN ORGANIZED HEALTH CARE EDUCATION/TRAINING PROGRAM

## 2025-06-11 PROCEDURE — G8427 DOCREV CUR MEDS BY ELIG CLIN: HCPCS | Performed by: STUDENT IN AN ORGANIZED HEALTH CARE EDUCATION/TRAINING PROGRAM

## 2025-06-11 PROCEDURE — 3078F DIAST BP <80 MM HG: CPT | Performed by: STUDENT IN AN ORGANIZED HEALTH CARE EDUCATION/TRAINING PROGRAM

## 2025-06-11 PROCEDURE — 1160F RVW MEDS BY RX/DR IN RCRD: CPT | Performed by: STUDENT IN AN ORGANIZED HEALTH CARE EDUCATION/TRAINING PROGRAM

## 2025-06-11 RX ORDER — METAXALONE 800 MG/1
800 TABLET ORAL 3 TIMES DAILY
Qty: 30 TABLET | Refills: 0 | Status: SHIPPED | OUTPATIENT
Start: 2025-06-11 | End: 2025-06-21

## 2025-06-11 SDOH — ECONOMIC STABILITY: FOOD INSECURITY: WITHIN THE PAST 12 MONTHS, YOU WORRIED THAT YOUR FOOD WOULD RUN OUT BEFORE YOU GOT MONEY TO BUY MORE.: NEVER TRUE

## 2025-06-11 SDOH — ECONOMIC STABILITY: FOOD INSECURITY: WITHIN THE PAST 12 MONTHS, THE FOOD YOU BOUGHT JUST DIDN'T LAST AND YOU DIDN'T HAVE MONEY TO GET MORE.: NEVER TRUE

## 2025-06-11 ASSESSMENT — PATIENT HEALTH QUESTIONNAIRE - PHQ9
SUM OF ALL RESPONSES TO PHQ QUESTIONS 1-9: 0
1. LITTLE INTEREST OR PLEASURE IN DOING THINGS: NOT AT ALL
2. FEELING DOWN, DEPRESSED OR HOPELESS: NOT AT ALL

## 2025-06-11 ASSESSMENT — ENCOUNTER SYMPTOMS
SHORTNESS OF BREATH: 0
ABDOMINAL PAIN: 0

## 2025-06-11 NOTE — PROGRESS NOTES
levothyroxine (SYNTHROID) 25 MCG tablet Take 1 tablet by mouth (Patient not taking: Reported on 6/11/2025)       No current facility-administered medications on file prior to visit.       No Known Allergies    Vitals:    06/11/25 0946   BP: 124/76   Pulse: 73   SpO2: 94%   Weight: 66.6 kg (146 lb 12.8 oz)   Height: 1.626 m (5' 4\")       Physical Exam  Constitutional:       General: She is not in acute distress.     Appearance: Normal appearance.   HENT:      Head: Normocephalic and atraumatic.   Eyes:      Extraocular Movements: Extraocular movements intact.      Conjunctiva/sclera: Conjunctivae normal.   Cardiovascular:      Rate and Rhythm: Normal rate and regular rhythm.      Heart sounds: Normal heart sounds.   Pulmonary:      Effort: Pulmonary effort is normal.      Breath sounds: Normal breath sounds.   Musculoskeletal:      Cervical back: Normal range of motion.   Skin:     General: Skin is warm and dry.   Neurological:      Mental Status: She is alert and oriented to person, place, and time.       Assessment & Plan  1.  Cervical radiculopathy versus occipital neuralgia  - Symptoms include sharp, electric shock-like pain in the back of the head, exacerbated by certain head movements.  - Baclofen, a muscle relaxer, has been ineffective in managing the pain.  - Physical therapy referral and x-ray will be ordered initially, followed by an MRI if necessary.  - Skelaxin prescribed    2. Hypercholesterolemia.  - The patient has a hereditary history of high cholesterol and is currently on medication.  - Blood work will be ordered to monitor cholesterol levels.  - Regular monitoring and management of cholesterol levels are ongoing.    3. Chronic constipation.  - The patient is currently taking Linzess for chronic constipation, which is working well.  - No changes to the current regimen are necessary.  - Continues to be effective in managing symptoms.    4. Cardiac issues.  - The patient is on Lasix due to concerns

## 2025-06-20 ENCOUNTER — RESULTS FOLLOW-UP (OUTPATIENT)
Dept: FAMILY MEDICINE CLINIC | Age: 80
End: 2025-06-20

## 2025-06-20 DIAGNOSIS — I10 PRIMARY HYPERTENSION: Primary | ICD-10-CM

## 2025-06-23 ENCOUNTER — OFFICE VISIT (OUTPATIENT)
Dept: FAMILY MEDICINE CLINIC | Age: 80
End: 2025-06-23
Payer: MEDICARE

## 2025-06-23 VITALS
WEIGHT: 145.6 LBS | OXYGEN SATURATION: 95 % | HEART RATE: 75 BPM | TEMPERATURE: 97.5 F | SYSTOLIC BLOOD PRESSURE: 122 MMHG | DIASTOLIC BLOOD PRESSURE: 82 MMHG | BODY MASS INDEX: 24.99 KG/M2

## 2025-06-23 DIAGNOSIS — M54.2 NECK PAIN: ICD-10-CM

## 2025-06-23 DIAGNOSIS — R05.1 ACUTE COUGH: Primary | ICD-10-CM

## 2025-06-23 PROCEDURE — 99214 OFFICE O/P EST MOD 30 MIN: CPT | Performed by: STUDENT IN AN ORGANIZED HEALTH CARE EDUCATION/TRAINING PROGRAM

## 2025-06-23 PROCEDURE — 1159F MED LIST DOCD IN RCRD: CPT | Performed by: STUDENT IN AN ORGANIZED HEALTH CARE EDUCATION/TRAINING PROGRAM

## 2025-06-23 PROCEDURE — G8427 DOCREV CUR MEDS BY ELIG CLIN: HCPCS | Performed by: STUDENT IN AN ORGANIZED HEALTH CARE EDUCATION/TRAINING PROGRAM

## 2025-06-23 PROCEDURE — 1036F TOBACCO NON-USER: CPT | Performed by: STUDENT IN AN ORGANIZED HEALTH CARE EDUCATION/TRAINING PROGRAM

## 2025-06-23 PROCEDURE — 3079F DIAST BP 80-89 MM HG: CPT | Performed by: STUDENT IN AN ORGANIZED HEALTH CARE EDUCATION/TRAINING PROGRAM

## 2025-06-23 PROCEDURE — 1090F PRES/ABSN URINE INCON ASSESS: CPT | Performed by: STUDENT IN AN ORGANIZED HEALTH CARE EDUCATION/TRAINING PROGRAM

## 2025-06-23 PROCEDURE — G8420 CALC BMI NORM PARAMETERS: HCPCS | Performed by: STUDENT IN AN ORGANIZED HEALTH CARE EDUCATION/TRAINING PROGRAM

## 2025-06-23 PROCEDURE — 1123F ACP DISCUSS/DSCN MKR DOCD: CPT | Performed by: STUDENT IN AN ORGANIZED HEALTH CARE EDUCATION/TRAINING PROGRAM

## 2025-06-23 PROCEDURE — G8399 PT W/DXA RESULTS DOCUMENT: HCPCS | Performed by: STUDENT IN AN ORGANIZED HEALTH CARE EDUCATION/TRAINING PROGRAM

## 2025-06-23 PROCEDURE — 3074F SYST BP LT 130 MM HG: CPT | Performed by: STUDENT IN AN ORGANIZED HEALTH CARE EDUCATION/TRAINING PROGRAM

## 2025-06-23 RX ORDER — GUAIFENESIN/DEXTROMETHORPHAN 100-10MG/5
5 SYRUP ORAL 3 TIMES DAILY PRN
Qty: 120 ML | Refills: 0 | Status: SHIPPED | OUTPATIENT
Start: 2025-06-23 | End: 2025-07-03

## 2025-06-23 RX ORDER — AZITHROMYCIN 250 MG/1
TABLET, FILM COATED ORAL
Qty: 6 TABLET | Refills: 0 | Status: SHIPPED | OUTPATIENT
Start: 2025-06-23 | End: 2025-07-03

## 2025-06-23 ASSESSMENT — ENCOUNTER SYMPTOMS
WHEEZING: 0
SHORTNESS OF BREATH: 1
ABDOMINAL PAIN: 0
COUGH: 1

## 2025-06-23 NOTE — PROGRESS NOTES
Lisa Rios is a 79 y.o.female who presents with   Chief Complaint   Patient presents with    Congestion     Congestion has moved into her chest  Did test negative for covid    Cough     All symptoms started about a week and a half ago    Headache     History of Present Illness  The patient presents for evaluation of cough and congestion.    She reports experiencing significant chest congestion, accompanied by a persistent cough. The mucus produced is green in color and occasionally obstructs her airway, necessitating forceful coughing to expel it. She has been taking amoxicillin, prescribed by Dr. Adams approximately a week ago. Additionally, she has been using Benadryl, which provides some relief but does not completely alleviate her symptoms. She does not experience any nasal congestion or postnasal drip. She also reports shortness of breath and fatigue, but no body aches or fever. Her sleep is disrupted due to her symptoms. She does not use DayQuil or NyQuil. She notes that her condition seems to fluctuate, with periods of improvement followed by worsening symptoms. She expresses concern about the potential impact of her congestion on her heart.    She was supposed to go to physical therapy, but she was not feeling well, so she had to cancel that. She wants to know what they are supposed to be doing in the physical therapy.    She has an upcoming appointment with cardiology, Dr. Avery on 07/02/2025. She was on Lasix with concerns of fluid buildup and irregular heart rhythm.    HPI    Review of Systems   Constitutional:  Negative for fatigue.   Eyes:  Negative for visual disturbance.   Respiratory:  Positive for cough and shortness of breath. Negative for wheezing.    Cardiovascular:  Negative for chest pain.   Gastrointestinal:  Negative for abdominal pain.   Skin:  Negative for rash.   Neurological:  Negative for dizziness, light-headedness and headaches.        Past Medical History:   Diagnosis Date    HLD

## 2025-06-30 NOTE — PROGRESS NOTES
mLs by mouth 3 times daily as needed for Cough 6/23/25 7/3/25 Yes Kaylyn Pino MD   azithromycin (ZITHROMAX) 250 MG tablet 500mg on day 1 followed by 250mg on days 2 - 5 6/23/25 7/3/25 Yes Kaylyn Pino MD   lisinopril (PRINIVIL;ZESTRIL) 20 MG tablet TAKE 1 TABLET DAILY IN THE MORNING 10/22/24  Yes Bri Pichardo MD   furosemide (LASIX) 20 MG tablet Take 1 tablet by mouth daily. May also take 1 tablet as needed (take additional 20 mg in the afternoon as needed for 3 lbs weight gain or increased swelling or increased SOB). 12/19/24  Yes Sujata Jackson APRN - CNP   potassium chloride (KLOR-CON M) 20 MEQ extended release tablet Take 1 tablet by mouth daily. May also take 1 tablet as needed (take additional 20 meq when you take the additional lasix 20 mg as needed). 12/19/24  Yes Sujata Jackson APRN - CNP   LINZESS 290 MCG CAPS capsule Take 1 capsule by mouth daily 9/23/24  Yes Bri Pichardo MD   aspirin 81 MG EC tablet Take 1 tablet by mouth daily 10/3/24  Yes Williams Armstrong MD   buPROPion (WELLBUTRIN XL) 150 MG extended release tablet  12/29/23  Yes Bri Pichardo MD   EPINEPHrine (EPIPEN) 0.3 MG/0.3ML SOAJ injection Inject 0.3 mLs into the muscle once 11/3/21  Yes Bri Pichardo MD   ezetimibe-simvastatin (VYTORIN) 10-20 MG per tablet Take 1 tablet by mouth every other day   Yes Bri Pichardo MD   fluconazole (DIFLUCAN) 150 MG tablet TAKE 1 TABLET BY MOUTH NOW. REPEAT IN 48 HOURS AS NEEDED   Yes rBi Pichardo MD   ketorolac 0.4 % SOLN ophthalmic solution PLACE 1 DROP IN THE RIGHT EYE 4 TIMES A DAY STARTING 1 DAY BEFORE SURGERY    Bri Pichardo MD   hydrOXYzine HCl (ATARAX) 25 MG tablet Take 1 tablet by mouth daily as needed  Patient not taking: Reported on 6/11/2025 9/17/24   Bri Pichardo MD        Review of Systems   Constitutional:  Negative for activity change, appetite change, diaphoresis, fatigue, fever and unexpected weight change.

## 2025-07-02 ENCOUNTER — OFFICE VISIT (OUTPATIENT)
Dept: CARDIOLOGY CLINIC | Age: 80
End: 2025-07-02
Payer: MEDICARE

## 2025-07-02 VITALS
DIASTOLIC BLOOD PRESSURE: 62 MMHG | BODY MASS INDEX: 24.24 KG/M2 | WEIGHT: 141.2 LBS | SYSTOLIC BLOOD PRESSURE: 120 MMHG | HEART RATE: 80 BPM

## 2025-07-02 DIAGNOSIS — I25.10 NONOCCLUSIVE CORONARY ATHEROSCLEROSIS OF NATIVE CORONARY ARTERY: ICD-10-CM

## 2025-07-02 DIAGNOSIS — E78.2 MIXED HYPERLIPIDEMIA: ICD-10-CM

## 2025-07-02 DIAGNOSIS — I10 PRIMARY HYPERTENSION: Primary | ICD-10-CM

## 2025-07-02 PROCEDURE — 99214 OFFICE O/P EST MOD 30 MIN: CPT | Performed by: INTERNAL MEDICINE

## 2025-07-02 PROCEDURE — 3074F SYST BP LT 130 MM HG: CPT | Performed by: INTERNAL MEDICINE

## 2025-07-02 PROCEDURE — G8420 CALC BMI NORM PARAMETERS: HCPCS | Performed by: INTERNAL MEDICINE

## 2025-07-02 PROCEDURE — 1036F TOBACCO NON-USER: CPT | Performed by: INTERNAL MEDICINE

## 2025-07-02 PROCEDURE — G8427 DOCREV CUR MEDS BY ELIG CLIN: HCPCS | Performed by: INTERNAL MEDICINE

## 2025-07-02 PROCEDURE — 1159F MED LIST DOCD IN RCRD: CPT | Performed by: INTERNAL MEDICINE

## 2025-07-02 PROCEDURE — 3078F DIAST BP <80 MM HG: CPT | Performed by: INTERNAL MEDICINE

## 2025-07-02 PROCEDURE — 1090F PRES/ABSN URINE INCON ASSESS: CPT | Performed by: INTERNAL MEDICINE

## 2025-07-02 PROCEDURE — 1123F ACP DISCUSS/DSCN MKR DOCD: CPT | Performed by: INTERNAL MEDICINE

## 2025-07-02 PROCEDURE — G8399 PT W/DXA RESULTS DOCUMENT: HCPCS | Performed by: INTERNAL MEDICINE

## 2025-07-02 RX ORDER — BACLOFEN 10 MG/1
10 TABLET ORAL NIGHTLY
COMMUNITY
Start: 2025-06-27

## 2025-07-02 RX ORDER — KETOROLAC TROMETHAMINE 4 MG/ML
SOLUTION/ DROPS OPHTHALMIC
COMMUNITY

## 2025-07-02 RX ORDER — VORTIOXETINE 20 MG/1
TABLET, FILM COATED ORAL
COMMUNITY
Start: 2025-06-27

## 2025-07-02 ASSESSMENT — ENCOUNTER SYMPTOMS
EYE DISCHARGE: 0
CHEST TIGHTNESS: 0
WHEEZING: 0
COLOR CHANGE: 0
BACK PAIN: 0
VOMITING: 0
COUGH: 0
ABDOMINAL DISTENTION: 0
SHORTNESS OF BREATH: 0
ABDOMINAL PAIN: 0
FACIAL SWELLING: 0
BLOOD IN STOOL: 0

## 2025-07-02 NOTE — ASSESSMENT & PLAN NOTE
Consider increasing Vytorin dose, ideal LDL between 50-70s considering recent CTA with mild to mod non obs CAD

## 2025-07-10 ENCOUNTER — NURSE ONLY (OUTPATIENT)
Dept: CARDIOLOGY CLINIC | Age: 80
End: 2025-07-10